# Patient Record
Sex: FEMALE | Race: BLACK OR AFRICAN AMERICAN | Employment: UNEMPLOYED | URBAN - METROPOLITAN AREA
[De-identification: names, ages, dates, MRNs, and addresses within clinical notes are randomized per-mention and may not be internally consistent; named-entity substitution may affect disease eponyms.]

---

## 2017-01-05 ENCOUNTER — OFFICE VISIT (OUTPATIENT)
Dept: INTERNAL MEDICINE CLINIC | Age: 52
End: 2017-01-05

## 2017-01-05 VITALS
WEIGHT: 172 LBS | OXYGEN SATURATION: 99 % | HEART RATE: 72 BPM | HEIGHT: 63 IN | SYSTOLIC BLOOD PRESSURE: 145 MMHG | TEMPERATURE: 97.1 F | RESPIRATION RATE: 16 BRPM | BODY MASS INDEX: 30.48 KG/M2 | DIASTOLIC BLOOD PRESSURE: 89 MMHG

## 2017-01-05 DIAGNOSIS — M54.5 RIGHT LOW BACK PAIN, UNSPECIFIED CHRONICITY, WITH SCIATICA PRESENCE UNSPECIFIED: ICD-10-CM

## 2017-01-05 DIAGNOSIS — R22.0 SWELLING OF GUMS: Primary | ICD-10-CM

## 2017-01-05 DIAGNOSIS — K08.89 PAIN IN A TOOTH OR TEETH: ICD-10-CM

## 2017-01-05 PROBLEM — M54.50 LOW BACK PAIN: Status: ACTIVE | Noted: 2017-01-05

## 2017-01-05 LAB
BILIRUB UR QL STRIP: NEGATIVE
GLUCOSE UR-MCNC: NEGATIVE MG/DL
KETONES P FAST UR STRIP-MCNC: NEGATIVE MG/DL
PH UR STRIP: 5.5 [PH] (ref 4.6–8)
PROT UR QL STRIP: NEGATIVE MG/DL
SP GR UR STRIP: 1.01 (ref 1–1.03)
UA UROBILINOGEN AMB POC: NORMAL (ref 0.2–1)
URINALYSIS CLARITY POC: CLEAR
URINALYSIS COLOR POC: YELLOW
URINE BLOOD POC: NEGATIVE
URINE LEUKOCYTES POC: NEGATIVE
URINE NITRITES POC: NEGATIVE

## 2017-01-05 RX ORDER — AMOXICILLIN AND CLAVULANATE POTASSIUM 875; 125 MG/1; MG/1
1 TABLET, FILM COATED ORAL EVERY 12 HOURS
Qty: 20 TAB | Refills: 0 | Status: SHIPPED | OUTPATIENT
Start: 2017-01-05 | End: 2017-01-15

## 2017-01-05 RX ORDER — IBUPROFEN 800 MG/1
800 TABLET ORAL
Qty: 90 TAB | Refills: 0 | Status: SHIPPED | OUTPATIENT
Start: 2017-01-05 | End: 2017-03-02 | Stop reason: SDUPTHER

## 2017-01-05 NOTE — MR AVS SNAPSHOT
Visit Information Date & Time Provider Department Dept. Phone Encounter #  
 1/5/2017 11:15 AM Waldemar Dunlap NP [facility] [phone] [id number] Your Appointments 1/13/2017  9:45 AM  
Office Visit with MD LENIN [facility]) Appt Note: 4 month f/u htn; Rescheduling Appointment From 12/26/2016  
 [address]  
  
   
 [address] [facility] Upcoming Health Maintenance Date Due Hepatitis C Screening 1965 COLONOSCOPY 4/30/1981 Pneumococcal 19-64 Medium Risk (1 of 1 - PPSV23) 4/30/1982 DTaP/Tdap/Td series (1 - Tdap) 4/30/1984 PAP AKA CERVICAL CYTOLOGY 4/30/1984 BREAST CANCER SCRN MAMMOGRAM 4/30/2013 INFLUENZA AGE 9 TO ADULT 8/1/2016 Allergies as of 1/5/2017  Review Complete On: 1/5/2017 By: Lorrain Snellen, LPN Severity Noted Reaction Type Reactions Hydrocodone-acetaminophen  08/25/2016    Other (comments) Current Immunizations  Reviewed on 8/25/2016 No immunizations on file. Not reviewed this visit You Were Diagnosed With   
  
 Codes Comments Swelling of gums    -  Primary ICD-10-CM: R22.0 ICD-9-CM: 784.2 Pain in a tooth or teeth     ICD-10-CM: K08.89 ICD-9-CM: 525.9 Vitals BP Pulse Temp Resp Height(growth percentile) Weight(growth percentile) 145/89 (BP 1 Location: Right arm, BP Patient Position: Sitting) 72 97.1 °F (36.2 °C) (Oral) 16 5' 3\" (1.6 m) 172 lb (78 kg) SpO2 BMI OB Status Smoking Status 99% 30.47 kg/m2 Postmenopausal Current Every Day Smoker Vitals History BMI and BSA Data Body Mass Index Body Surface Area  
 30.47 kg/m 2 1.86 m 2 Preferred Pharmacy Pharmacy Name Phone [facility] [address], [address] [place] [address] [phone] Your Updated Medication List  
  
   
 This list is accurate as of: 1/5/17 11:26 AM.  Always use your most recent med list. amLODIPine 10 mg tablet Commonly known as:  Ann Fraction Take 1 Tab by mouth daily. Indications: HYPERTENSION  
  
 amoxicillin-clavulanate 875-125 mg per tablet Commonly known as:  AUGMENTIN Take 1 Tab by mouth every twelve (12) hours for 10 days. clobetasol 0.05 % Sham  
Commonly known as:  CLOBEX Lather and leave on 15 minutes before rinsing. Use daily. divalproex  mg tablet Commonly known as:  DEPAKOTE Take 1 Tab by mouth three (3) times daily. furosemide 40 mg tablet Commonly known as:  LASIX Take 1/2 to 1 every day as needed for swelling  Indications: EDEMA  
  
 ibuprofen 800 mg tablet Commonly known as:  MOTRIN Take 1 Tab by mouth every eight (8) hours as needed for Pain for up to 30 days. lindane 1 % shampoo Commonly known as:  Gayleen Boyd Apply to scalp. Leave on 5 minutes before rinsing. Repeat in a week  
  
 tiZANidine 4 mg tablet Commonly known as:  Dorenda Sham Take one nightly as needed for muscle spasms Prescriptions Sent to Pharmacy Refills  
 amoxicillin-clavulanate (AUGMENTIN) 875-125 mg per tablet 0 Sig: Take 1 Tab by mouth every twelve (12) hours for 10 days. Class: Normal  
 Pharmacy: [facility] [address], [address] Ph #: [phone] Route: Oral  
 ibuprofen (MOTRIN) 800 mg tablet 0 Sig: Take 1 Tab by mouth every eight (8) hours as needed for Pain for up to 30 days. Class: Normal  
 Pharmacy: [facility] [address], [address] Ph #: [phone] Route: Oral  
  
Introducing [facility] & HEALTH SERVICES! Sam Hdez introduces [employer] patient portal. Now you can access parts of your medical record, email your doctor's office, and request medication refills online. 1. In your internet browser, go to [email or web address] 2. Click on the First Time User? Click Here link in the Sign In box. You will see the New Member Sign Up page. 3. Enter your [employer] Access Code exactly as it appears below. You will not need to use this code after youve completed the sign-up process. If you do not sign up before the expiration date, you must request a new code. · [employer] Access Code: [email or web address] Expires: 2/12/2017 11:37 AM 
 
4. Enter the last four digits of your Social Security Number (xxxx) and Date of Birth (mm/dd/yyyy) as indicated and click Submit. You will be taken to the next sign-up page. 5. Create a [employer] ID. This will be your [employer] login ID and cannot be changed, so think of one that is secure and easy to remember. 6. Create a [employer] password. You can change your password at any time. 7. Enter your Password Reset Question and Answer. This can be used at a later time if you forget your password. 8. Enter your e-mail address. You will receive e-mail notification when new information is available in [address] 9. Click Sign Up. You can now view and download portions of your medical record. 10. Click the Download Summary menu link to download a portable copy of your medical information. If you have questions, please visit the Frequently Asked Questions section of the [employer] website. Remember, [employer] is NOT to be used for urgent needs. For medical emergencies, dial 911. Now available from your iPhone and Android! Please provide this summary of care documentation to your next provider. Your primary care clinician is listed as [facility]. If you have any questions after today's visit, please call [phone].

## 2017-01-05 NOTE — PROGRESS NOTES
Pt presented today with gum swelling, pt thinks she may have an infection which is causing low back pain. Has pt had any falls since last visit? No.  Pt preferred language for health care discussion is english. Advanced Directive? No    Is someone accompanying this pt? No    Is the patient using any DME equipment during OV? No      1. Have you been to the ER, urgent care clinic since your last visit? Hospitalized since your last visit? No    2. Have you seen or consulted any other health care providers outside of the [address] [place] [address] since your last visit? Include any pap smears or colon screening. No      Pt has a reminder for a \"due or due soon\" health maintenance. I have asked that she contact his primary care provider for follow-up on this health maintenance.

## 2017-02-08 ENCOUNTER — TELEPHONE (OUTPATIENT)
Dept: INTERNAL MEDICINE CLINIC | Age: 52
End: 2017-02-08

## 2017-02-08 NOTE — TELEPHONE ENCOUNTER
2 pt. Identifiers confirmed. Pt. Stated that all week for 1 week she has has swelling, hives, itching and burning on her face only. Pt. Denies any throat swelling, wheezing, or SOB. She states she has not eaten or done anything different than usual. Pt. States she has not taken any benadryl because she thought she needed a prescription. Pt. Was told the she can get benadryl at any pharmacy, convenience store, or [employer] general so she could take this if she felt she was having an allergic reaction. Pt. Was told to come in and be seen in the first floor same day acute care clinic or any other urgent care nearby. She states transportation is an issue as she would need to take 2 buses. Pt. Was told to be seen ASAP was transferred to make an aptmt. For tomorrow as she prefers to see Dr. Doretha Kuhn, her PCP. No other questions/concerns at this time.

## 2017-03-02 DIAGNOSIS — M54.5 RIGHT LOW BACK PAIN, UNSPECIFIED CHRONICITY, WITH SCIATICA PRESENCE UNSPECIFIED: ICD-10-CM

## 2017-03-02 DIAGNOSIS — K08.89 PAIN IN A TOOTH OR TEETH: ICD-10-CM

## 2017-03-02 DIAGNOSIS — Z76.0 MEDICATION REFILL: ICD-10-CM

## 2017-03-02 RX ORDER — AMLODIPINE BESYLATE 10 MG/1
10 TABLET ORAL DAILY
Qty: 30 TAB | Refills: 5 | Status: SHIPPED | OUTPATIENT
Start: 2017-03-02 | End: 2017-09-14 | Stop reason: SDUPTHER

## 2017-03-02 RX ORDER — IBUPROFEN 800 MG/1
800 TABLET ORAL
Qty: 90 TAB | Refills: 5 | Status: SHIPPED | OUTPATIENT
Start: 2017-03-02 | End: 2018-05-23 | Stop reason: SDUPTHER

## 2017-03-02 NOTE — TELEPHONE ENCOUNTER
Patient request, last OV 1/8/17    Requested Prescriptions     Pending Prescriptions Disp Refills    amLODIPine (NORVASC) 10 mg tablet 30 Tab 5     Sig: Take 1 Tab by mouth daily. Indications: hypertension    ibuprofen (MOTRIN) 800 mg tablet 90 Tab 0     Sig: Take 1 Tab by mouth every eight (8) hours as needed for Pain for up to 30 days.

## 2017-03-08 NOTE — TELEPHONE ENCOUNTER
2 pt. Identifiers confirmed. F/u with pt from 2/8/17 issues with s/s or allergic reaction. Pt. States she is doing well. She states she never did get any benadryl for her previus s/s and never f/u in an urgent care. She stateds her s/s had resolved but have come back in the last few days. Pt. Denies any SOB (any more than usual as she is a smoker) or throat swelling. She states she is treating her hives/facial swelling with a facial she got at the store. Pt. Agreed to make a f/u aptmt to see Dr. Ata Cruz. No other questions/concerns at this time. Pt. Was transferred to  to schedule aptmt.

## 2017-03-09 ENCOUNTER — OFFICE VISIT (OUTPATIENT)
Dept: INTERNAL MEDICINE CLINIC | Age: 52
End: 2017-03-09

## 2017-03-09 VITALS
TEMPERATURE: 97.7 F | OXYGEN SATURATION: 98 % | SYSTOLIC BLOOD PRESSURE: 150 MMHG | RESPIRATION RATE: 16 BRPM | HEIGHT: 63 IN | DIASTOLIC BLOOD PRESSURE: 84 MMHG | HEART RATE: 75 BPM | BODY MASS INDEX: 30.23 KG/M2 | WEIGHT: 170.6 LBS

## 2017-03-09 DIAGNOSIS — L29.9 ITCHING: Primary | ICD-10-CM

## 2017-03-09 DIAGNOSIS — T78.40XA ALLERGIC REACTION, INITIAL ENCOUNTER: ICD-10-CM

## 2017-03-09 DIAGNOSIS — R22.0 SWELLING OF GUMS: ICD-10-CM

## 2017-03-09 RX ORDER — LEVOCETIRIZINE DIHYDROCHLORIDE 5 MG/1
5 TABLET, FILM COATED ORAL DAILY
Qty: 30 TAB | Refills: 2 | Status: SHIPPED | OUTPATIENT
Start: 2017-03-09 | End: 2018-06-15

## 2017-03-09 RX ORDER — MONTELUKAST SODIUM 10 MG/1
10 TABLET ORAL DAILY
Qty: 30 TAB | Refills: 2 | Status: SHIPPED | OUTPATIENT
Start: 2017-03-09 | End: 2018-06-15

## 2017-03-09 NOTE — PROGRESS NOTES
ROOM # 3    April Edgar presents today for   Chief Complaint   Patient presents with    Rash     pt had a rash on her face, states that she bought a facial cream to help it    Swelling     face and gums       April Edgar preferred language for health care discussion is english/other. Is someone accompanying this pt? no    Is the patient using any DME equipment during OV? no    Depression Screening:  PHQ 2 / 9, over the last two weeks 3/9/2017 3/9/2017 8/25/2016 8/25/2016 8/4/2016 3/3/2016 5/7/2015   Little interest or pleasure in doing things Not at all Not at all Not at all Not at all Not at all Not at all Not at all   Feeling down, depressed or hopeless Not at all Not at all Not at all Not at all Not at all Not at all Not at all   Total Score PHQ 2 0 0 0 0 0 0 0       Learning Assessment:  Learning Assessment 5/7/2015 5/13/2014   PRIMARY LEARNER Patient Patient   HIGHEST LEVEL OF EDUCATION - PRIMARY LEARNER  DID NOT GRADUATE HIGH SCHOOL DID NOT [facility]ADU[facility] CAREGIVER - No   PRIMARY [address]    NEED - No   LEARNER PREFERENCE PRIMARY DEMONSTRATION [address] - none   ANSWERED BY patient self   RELATIONSHIP SELF SELF       Abuse Screening:  Abuse Screening Questionnaire 4/6/2015   Do you ever feel afraid of your partner? N   Are you in a relationship with someone who physically or mentally threatens you? N   Is it safe for you to go home? Y       Fall Risk  No flowsheet data found. Health Maintenance reviewed and discussed per provider. Yes    April Edgar is due for MULTIPLE, see hm due. Please order/place referral if appropriate. Advance Directive:  1. Do you have an advance directive in place? Patient Reply: no    2. If not, would you like material regarding how to put one in place? Patient Reply: no    Coordination of Care:  1.  Have you been to the ER, urgent care clinic since your last visit? Hospitalized since your last visit? no    2. Have you seen or consulted any other health care providers outside of the [facility] [place] [facility]"" since your last visit? Include any pap smears or colon screening.  no

## 2017-03-09 NOTE — PROGRESS NOTES
HISTORY OF PRESENT ILLNESS  Cabrera Reed is a 46 y.o. female. Visit Vitals    /84 (BP 1 Location: Left arm, BP Patient Position: Sitting)    Pulse 75    Temp 97.7 °F (36.5 °C) (Oral)    Resp 16    Ht 5' 3\" (1.6 m)    Wt 170 lb 9.6 oz (77.4 kg)    SpO2 98%    BMI 30.22 kg/m2       HPI Comments: Pt has recurrent skin itching and irritation lis on her neck and face. Her face burns and swells. Her gums feel swollen  She lives in a new building. She is worried about the vents. She has tried cleaning everything in her place. She tried changing detergents as well. But nothing changed. Skin Problem   The history is provided by the patient (see comments). This is a chronic problem. The current episode started more than 1 week ago. The problem occurs daily. The problem has not changed since onset. Review of Systems   Constitutional: Negative for chills and fever. Respiratory: Negative. Cardiovascular: Negative. Skin: Positive for itching and rash. Physical Exam   Constitutional: She is oriented to person, place, and time. She appears well-developed and well-nourished. No distress. HENT:   Right Ear: Tympanic membrane, external ear and ear canal normal.   Left Ear: Tympanic membrane, external ear and ear canal normal.   Nose: Mucosal edema present. Right sinus exhibits no maxillary sinus tenderness and no frontal sinus tenderness. Left sinus exhibits no maxillary sinus tenderness and no frontal sinus tenderness. Mouth/Throat: Uvula is midline, oropharynx is clear and moist and mucous membranes are normal.   Lower gum line does seem a bit swollen. Cardiovascular: Normal rate and regular rhythm. Pulmonary/Chest: Effort normal and breath sounds normal.   Musculoskeletal: She exhibits no edema. Neurological: She is alert and oriented to person, place, and time. Skin: Skin is warm and dry. She is not diaphoretic. No obvious rashes noted.     Psychiatric: She has a normal mood and affect. Nursing note and vitals reviewed. ASSESSMENT and PLAN    ICD-10-CM ICD-9-CM    1. Itching L29.9 698.9    2. Swelling of gums R22.0 784.2    3. Allergic reaction, initial encounter T78.40XA 995.3        Lab done in November was OK    No rashes noted today. ? Medication side effect--but as not all over, less likely    Will try antihistamaine and singulair for now.  Consider referral to allergist    F/u 3-4 weeks

## 2017-03-09 NOTE — MR AVS SNAPSHOT
Visit Information Date & Time Provider Department Dept. Phone Encounter #  
 3/9/2017 12:15 PM Linda Osborne "[employer] [phone] [id number] Follow-up Instructions Return in about 3 weeks (around 3/30/2017) for itching and rash. ? medication. Upcoming Health Maintenance Date Due Hepatitis C Screening 1965 COLONOSCOPY 4/30/1981 Pneumococcal 19-64 Medium Risk (1 of 1 - PPSV23) 4/30/1982 DTaP/Tdap/Td series (1 - Tdap) 4/30/1984 PAP AKA CERVICAL CYTOLOGY 4/30/1984 BREAST CANCER SCRN MAMMOGRAM 4/30/2013 Allergies as of 3/9/2017  Review Complete On: 3/9/2017 By: Linda Osborne MD  
  
 Severity Noted Reaction Type Reactions Hydrocodone-acetaminophen  08/25/2016    Other (comments) Current Immunizations  Reviewed on 8/25/2016 No immunizations on file. Not reviewed this visit You Were Diagnosed With   
  
 Codes Comments Itching    -  Primary ICD-10-CM: L29.9 ICD-9-CM: 066. 9 Swelling of gums     ICD-10-CM: R22.0 ICD-9-CM: 601. 2 Allergic reaction, initial encounter     ICD-10-CM: T78.40XA ICD-9-CM: 222. 3 Vitals BP Pulse Temp Resp Height(growth percentile) Weight(growth percentile) 150/84 (BP 1 Location: Left arm, BP Patient Position: Sitting) 75 97.7 °F (36.5 °C) (Oral) 16 5' 3\" (1.6 m) 170 lb 9.6 oz (77.4 kg) SpO2 BMI OB Status Smoking Status 98% 30.22 kg/m2 Postmenopausal Current Every Day Smoker Vitals History BMI and BSA Data Body Mass Index Body Surface Area  
 30.22 kg/m 2 1.85 m 2 Preferred Pharmacy Pharmacy Name Phone [facility] [address], [address] [place] [address] [phone] Your Updated Medication List  
  
   
This list is accurate as of: 3/9/17  1:04 PM.  Always use your most recent med list. amLODIPine 10 mg tablet Commonly known as:  Galilea Spruce Take 1 Tab by mouth daily. Indications: hypertension  
  
 divalproex  mg tablet Commonly known as:  DEPAKOTE Take 1 Tab by mouth three (3) times daily. furosemide 40 mg tablet Commonly known as:  LASIX Take 1/2 to 1 every day as needed for swelling  Indications: EDEMA  
  
 ibuprofen 800 mg tablet Commonly known as:  MOTRIN Take 1 Tab by mouth every eight (8) hours as needed for Pain.  
  
 levocetirizine 5 mg tablet Commonly known as:  Lore Lynndyl Take 1 Tab by mouth daily. Indications: URTICARIA  
  
 montelukast 10 mg tablet Commonly known as:  SINGULAIR Take 1 Tab by mouth daily. Prescriptions Sent to Pharmacy Refills  
 montelukast (SINGULAIR) 10 mg tablet 2 Sig: Take 1 Tab by mouth daily. Class: Normal  
 Pharmacy: [address], [address] Ph #: [phone] Route: Oral  
 levocetirizine (XYZAL) 5 mg tablet 2 Sig: Take 1 Tab by mouth daily. Indications: URTICARIA Class: Normal  
 Pharmacy: [address], [address] Ph #: [phone] Route: Oral  
  
Follow-up Instructions Return in about 3 weeks (around 3/30/2017) for itching and rash. ? medication. Introducing [facility] SERVICES! Malou Mi introduces [employer] patient portal. Now you can access parts of your medical record, email your doctor's office, and request medication refills online. 1. In your internet browser, go to [email or web address] 2. Click on the First Time User? Click Here link in the Sign In box. You will see the New Member Sign Up page. 3. Enter your [employer] Access Code exactly as it appears below. You will not need to use this code after youve completed the sign-up process. If you do not sign up before the expiration date, you must request a new code. · [employer] Access Code: [email or web address] Expires: 6/7/2017  1:04 PM 
 
 4. Enter the last four digits of your Social Security Number (xxxx) and Date of Birth (mm/dd/yyyy) as indicated and click Submit. You will be taken to the next sign-up page. 5. Create a [employer] ID. This will be your [employer] login ID and cannot be changed, so think of one that is secure and easy to remember. 6. Create a [employer] password. You can change your password at any time. 7. Enter your Password Reset Question and Answer. This can be used at a later time if you forget your password. 8. Enter your e-mail address. You will receive e-mail notification when new information is available in [address] 9. Click Sign Up. You can now view and download portions of your medical record. 10. Click the Download Summary menu link to download a portable copy of your medical information. If you have questions, please visit the Frequently Asked Questions section of the [employer] website. Remember, [employer] is NOT to be used for urgent needs. For medical emergencies, dial 911. Now available from your iPhone and Android! Please provide this summary of care documentation to your next provider. Your primary care clinician is listed as [facility]. If you have any questions after today's visit, please call [phone].

## 2017-05-01 ENCOUNTER — OFFICE VISIT (OUTPATIENT)
Dept: INTERNAL MEDICINE CLINIC | Age: 52
End: 2017-05-01

## 2017-05-01 ENCOUNTER — HOSPITAL ENCOUNTER (OUTPATIENT)
Dept: LAB | Age: 52
Discharge: HOME | End: 2017-05-01
Payer: MEDICAID

## 2017-05-01 VITALS
RESPIRATION RATE: 16 BRPM | BODY MASS INDEX: 30.3 KG/M2 | WEIGHT: 171 LBS | HEIGHT: 63 IN | TEMPERATURE: 98.3 F | DIASTOLIC BLOOD PRESSURE: 89 MMHG | OXYGEN SATURATION: 95 % | SYSTOLIC BLOOD PRESSURE: 130 MMHG | HEART RATE: 80 BPM

## 2017-05-01 DIAGNOSIS — R39.9 UTI SYMPTOMS: Primary | ICD-10-CM

## 2017-05-01 DIAGNOSIS — R52 BODY ACHES: ICD-10-CM

## 2017-05-01 DIAGNOSIS — R82.90 ABNORMAL URINE ODOR: ICD-10-CM

## 2017-05-01 PROBLEM — Z00.00 ANNUAL PHYSICAL EXAM: Status: ACTIVE | Noted: 2017-05-01

## 2017-05-01 PROCEDURE — 36415 COLL VENOUS BLD VENIPUNCTURE: CPT | Performed by: NURSE PRACTITIONER

## 2017-05-01 PROCEDURE — 82652 VIT D 1 25-DIHYDROXY: CPT | Performed by: NURSE PRACTITIONER

## 2017-05-01 RX ORDER — SULFAMETHOXAZOLE AND TRIMETHOPRIM 800; 160 MG/1; MG/1
1 TABLET ORAL 2 TIMES DAILY
Qty: 6 TAB | Refills: 0 | Status: SHIPPED | OUTPATIENT
Start: 2017-05-01 | End: 2017-05-04

## 2017-05-01 NOTE — MR AVS SNAPSHOT
Visit Information Date & Time Provider Department Dept. Phone Encounter #  
 5/1/2017 11:30 AM Amy Aranda NP [facility] [address] [id number] Upcoming Health Maintenance Date Due Hepatitis C Screening 1965 COLONOSCOPY 4/30/1981 Pneumococcal 19-64 Medium Risk (1 of 1 - PPSV23) 4/30/1982 DTaP/Tdap/Td series (1 - Tdap) 4/30/1984 PAP AKA CERVICAL CYTOLOGY 4/30/1984 BREAST CANCER SCRN MAMMOGRAM 4/30/2013 INFLUENZA AGE 9 TO ADULT 8/1/2017 Allergies as of 5/1/2017  Review Complete On: 5/1/2017 By: Murphy Hernandez LPN Severity Noted Reaction Type Reactions Hydrocodone-acetaminophen  08/25/2016    Other (comments) Current Immunizations  Reviewed on 8/25/2016 No immunizations on file. Not reviewed this visit You Were Diagnosed With   
  
 Codes Comments UTI symptoms    -  Primary ICD-10-CM: R39.9 ICD-9-CM: 788.99 Abnormal urine odor     ICD-10-CM: R82.90 ICD-9-CM: 791.9 Body aches     ICD-10-CM: R52 ICD-9-CM: 780.96 Vitals BP Pulse Temp Resp Height(growth percentile) Weight(growth percentile) 130/89 (BP 1 Location: Right arm, BP Patient Position: Sitting) 80 98.3 °F (36.8 °C) (Oral) 16 5' 3\" (1.6 m) 171 lb (77.6 kg) SpO2 BMI OB Status Smoking Status 95% 30.29 kg/m2 Postmenopausal Current Every Day Smoker Vitals History BMI and BSA Data Body Mass Index Body Surface Area  
 30.29 kg/m 2 1.86 m 2 Preferred Pharmacy Pharmacy Name Phone [facility] [address], [address] [place] [address] [phone] Your Updated Medication List  
  
   
This list is accurate as of: 5/1/17 11:43 AM.  Always use your most recent med list. amLODIPine 10 mg tablet Commonly known as:  Pettit Staggers Take 1 Tab by mouth daily. Indications: hypertension  
  
 divalproex  mg tablet Commonly known as:  DEPAKOTE  
 Take 1 Tab by mouth three (3) times daily. furosemide 40 mg tablet Commonly known as:  LASIX Take 1/2 to 1 every day as needed for swelling  Indications: EDEMA  
  
 ibuprofen 800 mg tablet Commonly known as:  MOTRIN Take 1 Tab by mouth every eight (8) hours as needed for Pain.  
  
 levocetirizine 5 mg tablet Commonly known as:  Ananya Erichsen Take 1 Tab by mouth daily. Indications: URTICARIA  
  
 montelukast 10 mg tablet Commonly known as:  SINGULAIR Take 1 Tab by mouth daily. trimethoprim-sulfamethoxazole 160-800 mg per tablet Commonly known as:  BACTRIM DS, SEPTRA DS Take 1 Tab by mouth two (2) times a day for 3 days. Prescriptions Sent to Pharmacy Refills  
 trimethoprim-sulfamethoxazole (BACTRIM DS, SEPTRA DS) 160-800 mg per tablet 0 Sig: Take 1 Tab by mouth two (2) times a day for 3 days. Class: Normal  
 Pharmacy: [facility] [address], [address] Ph #: [phone] Route: Oral  
  
We Performed the Following AMB POC URINALYSIS DIP STICK AUTO W/O MICRO [65098 CPT(R)] To-Do List   
 05/01/2017 Lab:  VITAMIN D, 1, 25 DIHYDROXY Introducing [facility]! [employer] introduces [employer] patient portal. Now you can access parts of your medical record, email your doctor's office, and request medication refills online. 1. In your internet browser, go to [email or web address] 2. Click on the First Time User? Click Here link in the Sign In box. You will see the New Member Sign Up page. 3. Enter your [employer] Access Code exactly as it appears below. You will not need to use this code after youve completed the sign-up process. If you do not sign up before the expiration date, you must request a new code. · [employer] Access Code: [email or web address] Expires: 6/7/2017  2:04 PM 
 
4.  Enter the last four digits of your Social Security Number (xxxx) and Date of Birth (mm/dd/yyyy) as indicated and click Submit. You will be taken to the next sign-up page. 5. Create a [employer] ID. This will be your [employer] login ID and cannot be changed, so think of one that is secure and easy to remember. 6. Create a [employer] password. You can change your password at any time. 7. Enter your Password Reset Question and Answer. This can be used at a later time if you forget your password. 8. Enter your e-mail address. You will receive e-mail notification when new information is available in [address] 9. Click Sign Up. You can now view and download portions of your medical record. 10. Click the Download Summary menu link to download a portable copy of your medical information. If you have questions, please visit the Frequently Asked Questions section of the [employer] website. Remember, [employer] is NOT to be used for urgent needs. For medical emergencies, dial 911. Now available from your iPhone and Android! Please provide this summary of care documentation to your next provider. Your primary care clinician is listed as [facility]. If you have any questions after today's visit, please call [phone].

## 2017-05-01 NOTE — PROGRESS NOTES
Pt presented today with vaginal burning after urination x 2 weeks, pt reports she also has been having body aches. Has pt had any falls since last visit? No.  Pt preferred language for health care discussion is english. Advanced Directive? No    Is someone accompanying this pt? No    Is the patient using any DME equipment during OV? No      1. Have you been to the ER, urgent care clinic since your last visit? Hospitalized since your last visit? No    2. Have you seen or consulted any other health care providers outside of the [address] [place] [address] since your last visit? Include any pap smears or colon screening. No      Pt has a reminder for a \"due or due soon\" health maintenance. I have asked that she contact his primary care provider for follow-up on this health maintenance.

## 2017-05-01 NOTE — PROGRESS NOTES
HISTORY OF PRESENT ILLNESS  Ronaldo Kohler is a 45 y.o. female. Urinary Burning   The history is provided by the patient. This is a new problem. The current episode started more than 2 days ago. The problem occurs constantly. The problem has been gradually worsening. Pertinent negatives include no chest pain, no abdominal pain, no headaches and no shortness of breath. Associated symptoms comments: Frequency and urinary pain. Nothing aggravates the symptoms. Nothing relieves the symptoms. She has tried water for the symptoms. The treatment provided no relief. Generalized Body Aches   The history is provided by the patient. This is a new problem. Episode onset: few months ago. The problem occurs constantly. The problem has been gradually worsening. Pertinent negatives include no chest pain, no abdominal pain, no headaches and no shortness of breath. Nothing aggravates the symptoms. Nothing relieves the symptoms. She has tried nothing for the symptoms. The treatment provided no relief. Review of Systems   Constitutional: Positive for malaise/fatigue. Negative for chills, diaphoresis and fever. HENT: Negative for congestion and sore throat. Respiratory: Negative for cough and shortness of breath. Cardiovascular: Negative for chest pain and palpitations. Gastrointestinal: Negative for abdominal pain, heartburn, nausea and vomiting. Genitourinary: Positive for dysuria, frequency and urgency. Negative for flank pain and hematuria. Musculoskeletal: Positive for back pain, joint pain and myalgias. Negative for falls and neck pain. Skin: Negative for itching. Neurological: Negative for dizziness, tingling, weakness and headaches. Endo/Heme/Allergies: Negative for environmental allergies and polydipsia. Psychiatric/Behavioral: Negative for depression. The patient is not nervous/anxious. Physical Exam   Constitutional: She is oriented to person, place, and time.  She appears well-developed and well-nourished. No distress. HENT:   Head: Normocephalic and atraumatic. Right Ear: External ear normal.   Left Ear: External ear normal.   Mouth/Throat: Oropharynx is clear and moist. No oropharyngeal exudate. Neck: Neck supple. Cardiovascular: Regular rhythm. Pulmonary/Chest: Breath sounds normal. No respiratory distress. She has no wheezes. Abdominal: Soft. Bowel sounds are normal. She exhibits no distension. There is no tenderness. There is no rebound. Musculoskeletal: Normal range of motion. She exhibits no edema, tenderness or deformity. Lymphadenopathy:     She has no cervical adenopathy. Neurological: She is alert and oriented to person, place, and time. No cranial nerve deficit. Skin: Skin is dry. She is not diaphoretic. No erythema. Psychiatric: She has a normal mood and affect. Nursing note and vitals reviewed. ASSESSMENT and PLAN    ICD-10-CM ICD-9-CM    1. UTI symptoms R39.9 788.99 trimethoprim-sulfamethoxazole (BACTRIM DS, SEPTRA DS) 160-800 mg per tablet   2. Abnormal urine odor R82.90 791.9 AMB POC URINALYSIS DIP STICK AUTO W/O MICRO      trimethoprim-sulfamethoxazole (BACTRIM DS, SEPTRA DS) 160-800 mg per tablet   3. Body aches R52 780.96 VITAMIN D, 1, 25 DIHYDROXY   Patient advised to take medication as prescribed. Take rest and plenty of fluids. Return to clinic if condition worsens in next 72 hours.

## 2017-05-02 LAB — 1,25(OH)2D3 SERPL-MCNC: 44.4 PG/ML (ref 19.9–79.3)

## 2017-05-16 LAB
BILIRUB UR QL STRIP: NEGATIVE
GLUCOSE UR-MCNC: NEGATIVE MG/DL
KETONES P FAST UR STRIP-MCNC: NEGATIVE MG/DL
PH UR STRIP: 6.5 [PH] (ref 4.6–8)
PROT UR QL STRIP: NEGATIVE MG/DL
SP GR UR STRIP: 1.01 (ref 1–1.03)
UA UROBILINOGEN AMB POC: NORMAL (ref 0.2–1)
URINALYSIS CLARITY POC: CLEAR
URINALYSIS COLOR POC: YELLOW
URINE BLOOD POC: NORMAL
URINE LEUKOCYTES POC: NORMAL
URINE NITRITES POC: NEGATIVE

## 2017-06-05 ENCOUNTER — OFFICE VISIT (OUTPATIENT)
Dept: INTERNAL MEDICINE CLINIC | Age: 52
End: 2017-06-05

## 2017-06-05 ENCOUNTER — HOSPITAL ENCOUNTER (OUTPATIENT)
Dept: LAB | Age: 52
Discharge: HOME | End: 2017-06-05
Payer: MEDICAID

## 2017-06-05 VITALS
TEMPERATURE: 97.7 F | HEART RATE: 65 BPM | OXYGEN SATURATION: 98 % | SYSTOLIC BLOOD PRESSURE: 152 MMHG | HEIGHT: 63 IN | BODY MASS INDEX: 29.41 KG/M2 | RESPIRATION RATE: 16 BRPM | DIASTOLIC BLOOD PRESSURE: 93 MMHG | WEIGHT: 166 LBS

## 2017-06-05 DIAGNOSIS — N30.01 ACUTE CYSTITIS WITH HEMATURIA: Primary | ICD-10-CM

## 2017-06-05 DIAGNOSIS — R04.0 BLEEDING NOSE: ICD-10-CM

## 2017-06-05 DIAGNOSIS — R21 RASH: ICD-10-CM

## 2017-06-05 DIAGNOSIS — R39.9 UTI SYMPTOMS: ICD-10-CM

## 2017-06-05 DIAGNOSIS — L29.9 ITCHING: ICD-10-CM

## 2017-06-05 LAB
BASOPHILS # BLD AUTO: 0 K/UL (ref 0–0.06)
BASOPHILS # BLD: 0 % (ref 0–2)
BILIRUB UR QL STRIP: NEGATIVE
DIFFERENTIAL METHOD BLD: ABNORMAL
EOSINOPHIL # BLD: 0 K/UL (ref 0–0.4)
EOSINOPHIL NFR BLD: 1 % (ref 0–5)
ERYTHROCYTE [DISTWIDTH] IN BLOOD BY AUTOMATED COUNT: 14.2 % (ref 11.6–14.5)
GLUCOSE UR-MCNC: NEGATIVE MG/DL
HCT VFR BLD AUTO: 39 % (ref 35–45)
HGB BLD-MCNC: 13 G/DL (ref 12–16)
KETONES P FAST UR STRIP-MCNC: NORMAL MG/DL
LYMPHOCYTES # BLD AUTO: 42 % (ref 21–52)
LYMPHOCYTES # BLD: 2.1 K/UL (ref 0.9–3.6)
MCH RBC QN AUTO: 31 PG (ref 24–34)
MCHC RBC AUTO-ENTMCNC: 33.3 G/DL (ref 31–37)
MCV RBC AUTO: 93.1 FL (ref 74–97)
MONOCYTES # BLD: 0.3 K/UL (ref 0.05–1.2)
MONOCYTES NFR BLD AUTO: 6 % (ref 3–10)
NEUTS SEG # BLD: 2.6 K/UL (ref 1.8–8)
NEUTS SEG NFR BLD AUTO: 51 % (ref 40–73)
PH UR STRIP: 5 [PH] (ref 4.6–8)
PLATELET # BLD AUTO: 303 K/UL (ref 135–420)
PMV BLD AUTO: 9.8 FL (ref 9.2–11.8)
PROT UR QL STRIP: NEGATIVE MG/DL
RBC # BLD AUTO: 4.19 M/UL (ref 4.2–5.3)
SP GR UR STRIP: 1.01 (ref 1–1.03)
UA UROBILINOGEN AMB POC: NORMAL (ref 0.2–1)
URINALYSIS CLARITY POC: CLEAR
URINALYSIS COLOR POC: YELLOW
URINE BLOOD POC: NORMAL
URINE LEUKOCYTES POC: NORMAL
URINE NITRITES POC: NEGATIVE
WBC # BLD AUTO: 5.1 K/UL (ref 4.6–13.2)

## 2017-06-05 PROCEDURE — 85025 COMPLETE CBC W/AUTO DIFF WBC: CPT | Performed by: NURSE PRACTITIONER

## 2017-06-05 PROCEDURE — 36415 COLL VENOUS BLD VENIPUNCTURE: CPT | Performed by: NURSE PRACTITIONER

## 2017-06-05 RX ORDER — HYDROXYZINE HYDROCHLORIDE 10 MG/1
10 TABLET, FILM COATED ORAL
Qty: 21 TAB | Refills: 0 | Status: SHIPPED | OUTPATIENT
Start: 2017-06-05 | End: 2017-06-12

## 2017-06-05 RX ORDER — SULFAMETHOXAZOLE AND TRIMETHOPRIM 800; 160 MG/1; MG/1
1 TABLET ORAL 2 TIMES DAILY
Qty: 10 TAB | Refills: 0 | Status: SHIPPED | OUTPATIENT
Start: 2017-06-05 | End: 2017-06-10

## 2017-06-05 NOTE — PATIENT INSTRUCTIONS

## 2017-06-05 NOTE — PROGRESS NOTES
Pt presented today with vaginal burning x 2 weeks and pt reports she is experiencing a neck rash and body itching. Has pt had any falls since last visit? No.  Pt preferred language for health care discussion is english. Advanced Directive? No    Is someone accompanying this pt? No    Is the patient using any DME equipment during OV? No      1. Have you been to the ER, urgent care clinic since your last visit? Hospitalized since your last visit? No    2. Have you seen or consulted any other health care providers outside of the [facility] since your last visit? Include any pap smears or colon screening. No      Pt has a reminder for a \"due or due soon\" health maintenance. I have asked that she contact his primary care provider for follow-up on this health maintenance.

## 2017-06-05 NOTE — MR AVS SNAPSHOT
Visit Information Date & Time Provider Department Dept. Phone Encounter #  
 6/5/2017 11:00 AM Zachary Enriquez NP [facility] [phone] [id number] Your Appointments 6/12/2017  1:15 PM  
Office Visit with MD LENIN [facility]) Appt Note: STOMACH ISSUES; pt declined earlier appt; pt r/s from 05/26/2017  
 [address]  
  
   
 [address] [facility] Upcoming Health Maintenance Date Due Hepatitis C Screening 1965 COLONOSCOPY 4/30/1981 Pneumococcal 19-64 Medium Risk (1 of 1 - PPSV23) 4/30/1982 DTaP/Tdap/Td series (1 - Tdap) 4/30/1984 PAP AKA CERVICAL CYTOLOGY 4/30/1984 BREAST CANCER SCRN MAMMOGRAM 4/30/2013 INFLUENZA AGE 9 TO ADULT 8/1/2017 Allergies as of 6/5/2017  Review Complete On: 6/5/2017 By: Bala Sosa LPN Severity Noted Reaction Type Reactions Hydrocodone-acetaminophen  08/25/2016    Other (comments) Current Immunizations  Reviewed on 8/25/2016 No immunizations on file. Not reviewed this visit You Were Diagnosed With   
  
 Codes Comments Acute cystitis with hematuria    -  Primary ICD-10-CM: N30.01 
ICD-9-CM: 595.0 Rash     ICD-10-CM: R21 
ICD-9-CM: 782.1 Itching     ICD-10-CM: L29.9 ICD-9-CM: 698.9 Bleeding nose     ICD-10-CM: R04.0 ICD-9-CM: 784.7   
 UTI symptoms     ICD-10-CM: R39.9 ICD-9-CM: 788.99 Vitals BP Pulse Temp Resp Height(growth percentile) Weight(growth percentile) (!) 152/93 (BP 1 Location: Right arm, BP Patient Position: Sitting) 65 97.7 °F (36.5 °C) (Oral) 16 5' 3\" (1.6 m) 166 lb (75.3 kg) SpO2 BMI OB Status Smoking Status 98% 29.41 kg/m2 Postmenopausal Current Every Day Smoker Vitals History BMI and BSA Data  Body Mass Index Body Surface Area  
 29.41 kg/m 2 1.83 m 2  
  
  
 Preferred Pharmacy Pharmacy Name Phone [facility] [address], [address] [phone] Your Updated Medication List  
  
   
This list is accurate as of: 6/5/17 11:00 AM.  Always use your most recent med list. amLODIPine 10 mg tablet Commonly known as:  Claudell Hesselbach Take 1 Tab by mouth daily. Indications: hypertension  
  
 divalproex  mg tablet Commonly known as:  DEPAKOTE Take 1 Tab by mouth three (3) times daily. furosemide 40 mg tablet Commonly known as:  LASIX Take 1/2 to 1 every day as needed for swelling  Indications: EDEMA  
  
 hydrOXYzine HCl 10 mg tablet Commonly known as:  ATARAX Take 1 Tab by mouth three (3) times daily as needed for Itching for up to 7 days. ibuprofen 800 mg tablet Commonly known as:  MOTRIN Take 1 Tab by mouth every eight (8) hours as needed for Pain.  
  
 levocetirizine 5 mg tablet Commonly known as:  Avie Harsh Take 1 Tab by mouth daily. Indications: URTICARIA  
  
 montelukast 10 mg tablet Commonly known as:  SINGULAIR Take 1 Tab by mouth daily. trimethoprim-sulfamethoxazole 160-800 mg per tablet Commonly known as:  BACTRIM DS, SEPTRA DS Take 1 Tab by mouth two (2) times a day for 5 days. Prescriptions Sent to Pharmacy Refills  
 trimethoprim-sulfamethoxazole (BACTRIM DS, SEPTRA DS) 160-800 mg per tablet 0 Sig: Take 1 Tab by mouth two (2) times a day for 5 days. Class: Normal  
 Pharmacy: [facility] [address], [address] Ph #: [phone] Route: Oral  
 hydrOXYzine HCl (ATARAX) 10 mg tablet 0 Sig: Take 1 Tab by mouth three (3) times daily as needed for Itching for up to 7 days. Class: Normal  
 Pharmacy: [facility] [address], [address] Ph #: [phone] Route: Oral  
  
We Performed the Following AMB POC URINALYSIS DIP STICK MANUAL W/O MICRO [88578 CPT(R)] To-Do List   
 06/05/2017 Lab:  CBC WITH AUTOMATED DIFF Patient Instructions Rash: Care Instructions Your Care Instructions A rash is any irritation or inflammation of the skin. Rashes have many possible causes, including allergy, infection, illness, heat, and emotional stress. Follow-up care is a key part of your treatment and safety. Be sure to make and go to all appointments, and call your doctor if you are having problems. Its also a good idea to know your test results and keep a list of the medicines you take. How can you care for yourself at home? · Wash the area with water only. Soap can make dryness and itching worse. Pat dry. · Put cold, wet cloths on the rash to reduce itching. · Keep cool, and stay out of the sun. · Leave the rash open to the air as much of the time as possible. · Sometimes petroleum jelly (Vaseline) can help relieve the discomfort caused by a rash. A moisturizing lotion, such as Cetaphil, also may help. Calamine lotion may help for rashes caused by contact with something (such as a plant or soap) that irritated the skin. Use it 3 or 4 times a day. · If your doctor prescribed a cream, use it as directed. If your doctor prescribed medicine, take it exactly as directed. · If your rash itches so badly that it interferes with your normal activities, take an over-the-counter antihistamine, such as diphenhydramine (Benadryl) or loratadine (Claritin). Read and follow all instructions on the label. When should you call for help? Call your doctor now or seek immediate medical care if: 
· You have signs of infection, such as: 
¨ Increased pain, swelling, warmth, or redness. ¨ Red streaks leading from the area. ¨ Pus draining from the area. ¨ A fever. · You have joint pain along with the rash. Watch closely for changes in your health, and be sure to contact your doctor if: 
· Your rash is changing or getting worse.  For example, call if you have pain along with the rash, the rash is spreading, or you have new blisters. · You do not get better after 1 week. Where can you learn more? Go to http://ru-gigi.info/. Enter I857 in the search box to learn more about \"Rash: Care Instructions. \" Current as of: October 13, 2016 Content Version: 11.2 © 1355-3880 [employer]. Care instructions adapted under license by [employer] (which disclaims liability or warranty for this information). If you have questions about a medical condition or this instruction, always ask your healthcare professional. [place] [address] any warranty or liability for your use of this information. Introducing [facility]! Donovan Levine introduces Allak[employer] patient portal. Now you can access parts of your medical record, email your doctor's office, and request medication refills online. 1. In your internet browser, go to [email or web address] 2. Click on the First Time User? Click Here link in the Sign In box. You will see the New Member Sign Up page. 3. Enter your Allakos Access Code exactly as it appears below. You will not need to use this code after youve completed the sign-up process. If you do not sign up before the expiration date, you must request a new code. · All[employer] Access Code: [email or web address] Expires: 6/7/2017  2:04 PM 
 
4. Enter the last four digits of your Social Security Number (xxxx) and Date of Birth (mm/dd/yyyy) as indicated and click Submit. You will be taken to the next sign-up page. 5. Create a [employer] ID. This will be your Allakos login ID and cannot be changed, so think of one that is secure and easy to remember. 6. Create a [employer] password. You can change your password at any time. 7. Enter your Password Reset Question and Answer. This can be used at a later time if you forget your password. 8. Enter your e-mail address. You will receive e-mail notification when new information is available in [address] 9. Click Sign Up. You can now view and download portions of your medical record. 10. Click the Download Summary menu link to download a portable copy of your medical information. If you have questions, please visit the Frequently Asked Questions section of the [employer] website. Remember, [employer] is NOT to be used for urgent needs. For medical emergencies, dial 911. Now available from your iPhone and Android! Please provide this summary of care documentation to your next provider. Your primary care clinician is listed as [facility]. If you have any questions after today's visit, please call [phone].

## 2017-06-05 NOTE — PROGRESS NOTES
HISTORY OF PRESENT ILLNESS  Stephane Koyanagi is a 45 y.o. female. Urinary Burning   The history is provided by the patient. This is a new problem. The current episode started yesterday. The problem occurs constantly. The problem has not changed since onset. Pertinent negatives include no chest pain, no abdominal pain, no headaches and no shortness of breath. Nothing aggravates the symptoms. Nothing relieves the symptoms. She has tried nothing for the symptoms. The treatment provided no relief. Rash    The history is provided by the patient. This is a new problem. The current episode started more than 1 week ago. The problem has not changed since onset. The problem is associated with an unknown factor. There has been no fever. The rash is present on the neck and head. The pain is at a severity of 0/10. The patient is experiencing no pain. Associated symptoms include itching. Pertinent negatives include no blisters, no pain, no weeping and no hives. She has tried nothing for the symptoms. The treatment provided no relief. Review of Systems   Constitutional: Negative for chills, fever and malaise/fatigue. HENT: Negative for congestion and sore throat. Eyes: Negative for blurred vision and double vision. Respiratory: Negative for sputum production and shortness of breath. Cardiovascular: Negative for chest pain and palpitations. Gastrointestinal: Negative for abdominal pain, heartburn, nausea and vomiting. Genitourinary: Positive for dysuria, frequency and urgency. Negative for flank pain and hematuria. Musculoskeletal: Negative for myalgias and neck pain. Skin: Positive for itching and rash. Neurological: Negative for dizziness, tingling and headaches. Endo/Heme/Allergies: Negative for environmental allergies and polydipsia. Psychiatric/Behavioral: The patient is not nervous/anxious. Physical Exam   Constitutional: She is oriented to person, place, and time.  She appears well-developed and well-nourished. No distress. HENT:   Head: Normocephalic and atraumatic. Right Ear: External ear normal.   Left Ear: External ear normal.   Mouth/Throat: Oropharynx is clear and moist. No oropharyngeal exudate. Eyes: EOM are normal. Pupils are equal, round, and reactive to light. Neck: Normal range of motion. Neck supple. No thyromegaly present. Cardiovascular: Regular rhythm. Pulmonary/Chest: Effort normal and breath sounds normal. No respiratory distress. She has no wheezes. Lymphadenopathy:     She has no cervical adenopathy. Neurological: She is alert and oriented to person, place, and time. No cranial nerve deficit. Skin: Skin is warm and dry. She is not diaphoretic. Psychiatric: She has a normal mood and affect. Nursing note and vitals reviewed. ASSESSMENT and PLAN    ICD-10-CM ICD-9-CM    1. Acute cystitis with hematuria N30.01 595.0 trimethoprim-sulfamethoxazole (BACTRIM DS, SEPTRA DS) 160-800 mg per tablet   2. Rash R21 782.1    3. Itching L29.9 698.9 hydrOXYzine HCl (ATARAX) 10 mg tablet   4. Bleeding nose R04.0 784.7 CBC WITH AUTOMATED DIFF   5. UTI symptoms R39.9 788.99 AMB POC URINALYSIS DIP STICK MANUAL W/O MICRO   further plan of care will be established according to lab results and patient's response to current therapy. AVS instruction printed and provided to the patient. General instruction given regarding plan of care. Patient verbalized the understanding of all information.

## 2017-07-13 ENCOUNTER — OFFICE VISIT (OUTPATIENT)
Dept: INTERNAL MEDICINE CLINIC | Age: 52
End: 2017-07-13

## 2017-07-13 ENCOUNTER — HOSPITAL ENCOUNTER (OUTPATIENT)
Dept: LAB | Age: 52
Discharge: HOME | End: 2017-07-13
Payer: MEDICAID

## 2017-07-13 VITALS
WEIGHT: 166 LBS | DIASTOLIC BLOOD PRESSURE: 87 MMHG | TEMPERATURE: 98.2 F | HEIGHT: 63 IN | OXYGEN SATURATION: 97 % | SYSTOLIC BLOOD PRESSURE: 146 MMHG | HEART RATE: 81 BPM | BODY MASS INDEX: 29.41 KG/M2 | RESPIRATION RATE: 18 BRPM

## 2017-07-13 DIAGNOSIS — Z12.11 SCREEN FOR COLON CANCER: ICD-10-CM

## 2017-07-13 DIAGNOSIS — R10.30 LOWER ABDOMINAL PAIN: ICD-10-CM

## 2017-07-13 DIAGNOSIS — K59.09 CHRONIC CONSTIPATION: ICD-10-CM

## 2017-07-13 DIAGNOSIS — R10.2 PELVIC PAIN IN FEMALE: ICD-10-CM

## 2017-07-13 DIAGNOSIS — M54.41 ACUTE BILATERAL LOW BACK PAIN WITH RIGHT-SIDED SCIATICA: Primary | ICD-10-CM

## 2017-07-13 LAB
ALBUMIN SERPL BCP-MCNC: 4.2 G/DL (ref 3.4–5)
ALBUMIN/GLOB SERPL: 1.2 {RATIO} (ref 0.8–1.7)
ALP SERPL-CCNC: 119 U/L (ref 45–117)
ALT SERPL-CCNC: 29 U/L (ref 13–56)
ANION GAP BLD CALC-SCNC: 12 MMOL/L (ref 3–18)
APPEARANCE UR: CLEAR
AST SERPL W P-5'-P-CCNC: 25 U/L (ref 15–37)
BASOPHILS # BLD AUTO: 0 K/UL (ref 0–0.06)
BASOPHILS # BLD: 0 % (ref 0–2)
BILIRUB SERPL-MCNC: 0.4 MG/DL (ref 0.2–1)
BILIRUB UR QL: NEGATIVE
BUN SERPL-MCNC: 20 MG/DL (ref 7–18)
BUN/CREAT SERPL: 22 (ref 12–20)
CALCIUM SERPL-MCNC: 9.9 MG/DL (ref 8.5–10.1)
CHLORIDE SERPL-SCNC: 104 MMOL/L (ref 100–108)
CO2 SERPL-SCNC: 23 MMOL/L (ref 21–32)
COLOR UR: YELLOW
CREAT SERPL-MCNC: 0.9 MG/DL (ref 0.6–1.3)
DIFFERENTIAL METHOD BLD: ABNORMAL
EOSINOPHIL # BLD: 0 K/UL (ref 0–0.4)
EOSINOPHIL NFR BLD: 0 % (ref 0–5)
ERYTHROCYTE [DISTWIDTH] IN BLOOD BY AUTOMATED COUNT: 14.8 % (ref 11.6–14.5)
GLOBULIN SER CALC-MCNC: 3.5 G/DL (ref 2–4)
GLUCOSE SERPL-MCNC: 112 MG/DL (ref 74–99)
GLUCOSE UR STRIP.AUTO-MCNC: NEGATIVE MG/DL
HCT VFR BLD AUTO: 40.3 % (ref 35–45)
HGB BLD-MCNC: 13.3 G/DL (ref 12–16)
HGB UR QL STRIP: NEGATIVE
KETONES UR QL STRIP.AUTO: NEGATIVE MG/DL
LEUKOCYTE ESTERASE UR QL STRIP.AUTO: NEGATIVE
LYMPHOCYTES # BLD AUTO: 12 % (ref 21–52)
LYMPHOCYTES # BLD: 1.1 K/UL (ref 0.9–3.6)
MCH RBC QN AUTO: 30.5 PG (ref 24–34)
MCHC RBC AUTO-ENTMCNC: 33 G/DL (ref 31–37)
MCV RBC AUTO: 92.4 FL (ref 74–97)
MONOCYTES # BLD: 0.1 K/UL (ref 0.05–1.2)
MONOCYTES NFR BLD AUTO: 1 % (ref 3–10)
NEUTS SEG # BLD: 7.4 K/UL (ref 1.8–8)
NEUTS SEG NFR BLD AUTO: 87 % (ref 40–73)
NITRITE UR QL STRIP.AUTO: NEGATIVE
PH UR STRIP: 5.5 [PH] (ref 5–8)
PLATELET # BLD AUTO: 334 K/UL (ref 135–420)
PMV BLD AUTO: 10 FL (ref 9.2–11.8)
POTASSIUM SERPL-SCNC: 4.5 MMOL/L (ref 3.5–5.5)
PROT SERPL-MCNC: 7.7 G/DL (ref 6.4–8.2)
PROT UR STRIP-MCNC: NEGATIVE MG/DL
RBC # BLD AUTO: 4.36 M/UL (ref 4.2–5.3)
SODIUM SERPL-SCNC: 139 MMOL/L (ref 136–145)
SP GR UR REFRACTOMETRY: 1 (ref 1–1.03)
UROBILINOGEN UR QL STRIP.AUTO: 0.2 EU/DL (ref 0.2–1)
WBC # BLD AUTO: 8.6 K/UL (ref 4.6–13.2)

## 2017-07-13 PROCEDURE — 80053 COMPREHEN METABOLIC PANEL: CPT | Performed by: INTERNAL MEDICINE

## 2017-07-13 PROCEDURE — 81003 URINALYSIS AUTO W/O SCOPE: CPT | Performed by: INTERNAL MEDICINE

## 2017-07-13 PROCEDURE — 85025 COMPLETE CBC W/AUTO DIFF WBC: CPT | Performed by: INTERNAL MEDICINE

## 2017-07-13 RX ORDER — PREDNISONE 10 MG/1
10 TABLET ORAL
COMMUNITY
Start: 2017-06-29 | End: 2018-07-06 | Stop reason: ALTCHOICE

## 2017-07-13 RX ORDER — POLYETHYLENE GLYCOL 3350 17 G/17G
17 POWDER, FOR SOLUTION ORAL DAILY
Qty: 510 G | Refills: 5 | Status: SHIPPED | OUTPATIENT
Start: 2017-07-13 | End: 2018-07-06 | Stop reason: SDUPTHER

## 2017-07-13 NOTE — PROGRESS NOTES
Chief Complaint   Patient presents with    Abdominal Pain     and swelling in lower abdomin       Pt preferred language for health care discussion is English. Is someone accompanying this pt? no    Is the patient using any DME equipment during OV? no    Depression Screening:  PHQ over the last two weeks 7/13/2017 3/9/2017 3/9/2017 8/25/2016 8/25/2016 8/4/2016 3/3/2016   Little interest or pleasure in doing things Not at all Not at all Not at all Not at all Not at all Not at all Not at all   Feeling down, depressed or hopeless Not at all Not at all Not at all Not at all Not at all Not at all Not at all   Total Score PHQ 2 0 0 0 0 0 0 0       Learning Assessment:  Learning Assessment 5/7/2015 5/13/2014   PRIMARY LEARNER Patient Patient   HIGHEST LEVEL OF EDUCATION - PRIMARY LEARNER  DID NOT GRADUATE HIGH SCHOOL DID NOT GRADUATE [facility] PRIMARY LEARNER NONE NONE   CO-LEARNER CAREGIVER - No   PRIMARY [address]    NEED - No   LEARNER PREFERENCE PRIMARY DEMONSTRATION DEMONSTRATION   LEARNING SPECIAL TOPICS - none   ANSWERED BY patient self   RELATIONSHIP SELF SELF       Abuse Screening:  Abuse Screening Questionnaire 4/6/2015   Do you ever feel afraid of your partner? N   Are you in a relationship with someone who physically or mentally threatens you? N   Is it safe for you to go home? Y         Health Maintenance reviewed and discussed per provider. Yes    Pt is due for Colonoscopy, Mammogram, Pap, Hep C screen, Pneumo-13 or Peumo-23, Tdap. Please order/place referral if appropriate. Advance Directive:  1. Do you have an advance directive in place? Patient Reply:no    2. If not, would you like material regarding how to put one in place? Patient Reply: no    Coordination of Care:  1. Have you been to the ER, urgent care clinic since your last visit? Hospitalized since your last visit? [facility] 6/29/17    2.  Have you seen or consulted any other health care providers outside of the [address] [place] [address] [facility] since your last visit? Include any pap smears or colon screening.  no

## 2017-07-13 NOTE — PROGRESS NOTES
HISTORY OF PRESENT ILLNESS  Cris Ramirez is a 45 y.o. female. Visit Vitals    /87    Pulse 81    Temp 98.2 °F (36.8 °C) (Oral)    Resp 18    Ht 5' 3\" (1.6 m)    Wt 166 lb (75.3 kg)    SpO2 97%    BMI 29.41 kg/m2       HPI Comments: Pt has been having right sided pain. The worst pain in in her \"tailbone\" where she could not get comfortable. Her hip and upper leg hurt also. They took Xrays and meds. -- prednisone taper. She did not take it correctly. Abdominal Pain   The history is provided by the patient (see comments). This is a chronic problem. The current episode started more than 1 week ago. The problem occurs daily. Associated symptoms include abdominal pain (states she gets pubiic fullness and swelling. Bowels are not regular--tends toward constipation. ). Exacerbated by: activity. The symptoms are relieved by medications. Back Pain    The history is provided by the patient. This is a chronic problem. The current episode started more than 1 week ago. The problem has been gradually worsening. The problem occurs daily. The pain is present in the lumbar spine. The pain radiates to the right thigh and right knee. The pain is moderate. The symptoms are aggravated by certain positions. Associated symptoms include abdominal pain (states she gets pubiic fullness and swelling. Bowels are not regular--tends toward constipation. ). Pertinent negatives include no fever. Review of Systems   Constitutional: Negative. Negative for chills and fever. Gastrointestinal: Positive for abdominal pain (states she gets pubiic fullness and swelling. Bowels are not regular--tends toward constipation. ). Genitourinary:        Denies any bladder sxs     Musculoskeletal: Positive for back pain. Physical Exam   Constitutional: She is oriented to person, place, and time. She appears well-developed and well-nourished. No distress. Cardiovascular: Normal rate.     Pulmonary/Chest: Effort normal. Abdominal: Soft. Bowel sounds are normal. She exhibits no distension. There is no tenderness. There is no rebound and no guarding. Musculoskeletal: She exhibits no edema. strength appears normal on individual testing. Neg SLR bilat. Neurological: She is alert and oriented to person, place, and time. She displays abnormal reflex (slightly hyperreflexic bilat LE). Coordination normal.   Skin: Skin is warm and dry. She is not diaphoretic. Psychiatric: She has a normal mood and affect. Nursing note and vitals reviewed. ASSESSMENT and PLAN    ICD-10-CM ICD-9-CM    1. Acute bilateral low back pain with right-sided sciatica M54.41 724.2      724.3      338.19    2. Lower abdominal pain R10.30 789.09 REFERRAL TO GASTROENTEROLOGY      US PELV NON OBS      METABOLIC PANEL, COMPREHENSIVE      URINALYSIS W/ RFLX MICROSCOPIC      CBC WITH AUTOMATED DIFF   3. Pelvic pain in female R10.2 625.9 US PELV NON OBS      URINALYSIS W/ RFLX MICROSCOPIC   4. Chronic constipation K59.09 564.00 polyethylene glycol (MIRALAX) 17 gram/dose powder   5. Screen for colon cancer Z12.11 V76.51 REFERRAL TO GASTROENTEROLOGY     Available hospital record reviewed--from ER    Will try adding miralax for the bloat and constipation. Will request pelvic u/s--she never got the u/s ordered a year ago and the CT requested by the NP was never done due to insurance denial.    She is due for a colonoscopy    Update as no recent kidney or liver tests or urine    I suspect she has sciatica as the ER felt. Continue same meds for this. But no evidence of impending neurological emergency    Incidentally,  Discussed BMI/weight, lifestyle, diet and exercise. She is already working on this and has lost weight    BP up slightly--but she is hurting some and just took an ibuprofen.     F/u one month for recheck

## 2017-07-13 NOTE — MR AVS SNAPSHOT
Visit Information Date & Time Provider Department Dept. Phone Encounter #  
 7/13/2017 11:30 AM Allan Calvillo, [address] [id number] Follow-up Instructions Return in about 1 month (around 8/13/2017) for back and flank pain, u/s f/u. Upcoming Health Maintenance Date Due Hepatitis C Screening 1965 COLONOSCOPY 4/30/1981 Pneumococcal 19-64 Medium Risk (1 of 1 - PPSV23) 4/30/1982 DTaP/Tdap/Td series (1 - Tdap) 4/30/1984 PAP AKA CERVICAL CYTOLOGY 4/30/1984 BREAST CANCER SCRN MAMMOGRAM 4/30/2013 INFLUENZA AGE 9 TO ADULT 8/1/2017 Allergies as of 7/13/2017  Review Complete On: 7/13/2017 By: Allan Calvillo MD  
  
 Severity Noted Reaction Type Reactions Hydrocodone-acetaminophen  08/25/2016    Other (comments) Current Immunizations  Reviewed on 8/25/2016 No immunizations on file. Not reviewed this visit You Were Diagnosed With   
  
 Codes Comments Acute bilateral low back pain with right-sided sciatica    -  Primary ICD-10-CM: M54.41 
ICD-9-CM: 724.2, 724.3, 338.19 Lower abdominal pain     ICD-10-CM: R10.30 ICD-9-CM: 789.09 Pelvic pain in female     ICD-10-CM: R10.2 ICD-9-CM: 625.9 Chronic constipation     ICD-10-CM: K59.09 
ICD-9-CM: 564.00 Screen for colon cancer     ICD-10-CM: Z12.11 ICD-9-CM: V76.51 Vitals BP Pulse Temp Resp Height(growth percentile) Weight(growth percentile) 146/87 81 98.2 °F (36.8 °C) (Oral) 18 5' 3\" (1.6 m) 166 lb (75.3 kg) SpO2 BMI OB Status Smoking Status 97% 29.41 kg/m2 Postmenopausal Current Every Day Smoker Vitals History BMI and BSA Data Body Mass Index Body Surface Area  
 29.41 kg/m 2 1.83 m 2 Preferred Pharmacy Pharmacy Name Phone [facility] [address], [address] [place] [address] [phone] Your Updated Medication List  
  
   
 This list is accurate as of: 7/13/17 12:18 PM.  Always use your most recent med list. amLODIPine 10 mg tablet Commonly known as:  Sundra Williamson Take 1 Tab by mouth daily. Indications: hypertension  
  
 divalproex  mg tablet Commonly known as:  DEPAKOTE Take 1 Tab by mouth three (3) times daily. furosemide 40 mg tablet Commonly known as:  LASIX Take 1/2 to 1 every day as needed for swelling  Indications: EDEMA  
  
 ibuprofen 800 mg tablet Commonly known as:  MOTRIN Take 1 Tab by mouth every eight (8) hours as needed for Pain.  
  
 levocetirizine 5 mg tablet Commonly known as:  Fredrica Corby Take 1 Tab by mouth daily. Indications: URTICARIA  
  
 montelukast 10 mg tablet Commonly known as:  SINGULAIR Take 1 Tab by mouth daily. polyethylene glycol 17 gram/dose powder Commonly known as:  Lugene Minder Take 17 g by mouth daily. predniSONE 10 mg tablet Commonly known as:  DELTASONE  
10 mg.  
  
  
  
  
Prescriptions Sent to Pharmacy Refills  
 polyethylene glycol (MIRALAX) 17 gram/dose powder 5 Sig: Take 17 g by mouth daily. Class: Normal  
 Pharmacy: [facility] [address], [address] Ph #: [phone] Route: Oral  
  
We Performed the Following REFERRAL TO GASTROENTEROLOGY [BWX57 Custom] Follow-up Instructions Return in about 1 month (around 8/13/2017) for back and flank pain, u/s f/u. To-Do List   
 07/13/2017 Lab:  CBC WITH AUTOMATED DIFF   
  
 07/13/2017 Lab:  METABOLIC PANEL, COMPREHENSIVE   
  
 07/13/2017 Lab:  URINALYSIS W/ RFLX MICROSCOPIC   
  
 07/13/2017 Imaging:  US PELV NON OBS Referral Information Referral ID Referred By Referred To  
  
 [id number] [facility] Not Available Visits Status Start Date End Date 1 New Request 7/13/17 7/13/18  If your referral has a status of pending review or denied, additional information will be sent to support the outcome of this decision. Introducing [facility] & HEALTH SERVICES! Willadean Saint introduces [employer] patient portal. Now you can access parts of your medical record, email your doctor's office, and request medication refills online. 1. In your internet browser, go to [email or web address] 2. Click on the First Time User? Click Here link in the Sign In box. You will see the New Member Sign Up page. 3. Enter your Moka Access Code exactly as it appears below. You will not need to use this code after youve completed the sign-up process. If you do not sign up before the expiration date, you must request a new code. · Moka Access Code: [email or web address] Expires: 10/11/2017 12:18 PM 
 
4. Enter the last four digits of your Social Security Number (xxxx) and Date of Birth (mm/dd/yyyy) as indicated and click Submit. You will be taken to the next sign-up page. 5. Create a Moka ID. This will be your Moka login ID and cannot be changed, so think of one that is secure and easy to remember. 6. Create a Moka password. You can change your password at any time. 7. Enter your Password Reset Question and Answer. This can be used at a later time if you forget your password. 8. Enter your e-mail address. You will receive e-mail notification when new information is available in [address] 9. Click Sign Up. You can now view and download portions of your medical record. 10. Click the Download Summary menu link to download a portable copy of your medical information. If you have questions, please visit the Frequently Asked Questions section of the Moka website. Remember, [employer] is NOT to be used for urgent needs. For medical emergencies, dial 911. Now available from your iPhone and Android! Please provide this summary of care documentation to your next provider. Your primary care clinician is listed as [facility].  If you have any questions after today's visit, please call [phone].

## 2017-08-23 ENCOUNTER — HOSPITAL ENCOUNTER (OUTPATIENT)
Dept: ULTRASOUND IMAGING | Age: 52
Discharge: HOME | End: 2017-08-23
Attending: INTERNAL MEDICINE
Payer: MEDICAID

## 2017-08-23 DIAGNOSIS — R10.2 PELVIC PAIN IN FEMALE: ICD-10-CM

## 2017-08-23 DIAGNOSIS — R10.30 LOWER ABDOMINAL PAIN: ICD-10-CM

## 2017-08-23 PROCEDURE — 76830 TRANSVAGINAL US NON-OB: CPT

## 2017-08-24 ENCOUNTER — TELEPHONE (OUTPATIENT)
Dept: INTERNAL MEDICINE CLINIC | Age: 52
End: 2017-08-24

## 2017-08-24 NOTE — TELEPHONE ENCOUNTER
----- Message from Adam Morocho MD sent at 8/23/2017  7:18 PM EDT -----  Advise her that her ultrasound did not find anything significant.

## 2017-08-24 NOTE — TELEPHONE ENCOUNTER
Spoke with patient, confirmed name and . Advised patient of below message, per Dr. Dewane Mortimer. Patient verbalized understanding.      Be advised

## 2017-09-14 DIAGNOSIS — Z76.0 MEDICATION REFILL: ICD-10-CM

## 2017-09-14 DIAGNOSIS — R60.9 EDEMA, UNSPECIFIED TYPE: ICD-10-CM

## 2017-09-14 RX ORDER — AMLODIPINE BESYLATE 10 MG/1
10 TABLET ORAL DAILY
Qty: 30 TAB | Refills: 5 | Status: SHIPPED | OUTPATIENT
Start: 2017-09-14 | End: 2017-10-12 | Stop reason: SDUPTHER

## 2017-09-14 RX ORDER — FUROSEMIDE 40 MG/1
TABLET ORAL
Qty: 30 TAB | Refills: 5 | Status: SHIPPED | OUTPATIENT
Start: 2017-09-14 | End: 2018-11-28 | Stop reason: SDUPTHER

## 2017-10-12 DIAGNOSIS — Z76.0 MEDICATION REFILL: ICD-10-CM

## 2017-10-12 RX ORDER — PREDNISONE 10 MG/1
10 TABLET ORAL DAILY
Qty: 30 TAB | Refills: 1 | Status: CANCELLED | OUTPATIENT
Start: 2017-10-12

## 2017-10-12 RX ORDER — AMLODIPINE BESYLATE 10 MG/1
10 TABLET ORAL DAILY
Qty: 30 TAB | Refills: 5 | Status: SHIPPED | OUTPATIENT
Start: 2017-10-12 | End: 2018-09-30 | Stop reason: SDUPTHER

## 2017-10-12 NOTE — TELEPHONE ENCOUNTER
Requested Prescriptions     Pending Prescriptions Disp Refills    predniSONE (DELTASONE) 10 mg tablet       Si Tab.  amLODIPine (NORVASC) 10 mg tablet 30 Tab 5     Sig: Take 1 Tab by mouth daily.  Indications: hypertension

## 2017-10-13 NOTE — TELEPHONE ENCOUNTER
Contacted pt at [facility] number. Two patient Identifiers confirmed. Advised pt per Dr Adebayo Chen notes. Pt verbalized understanding and scheduled for November 28, 2017 with PCP.

## 2017-10-13 NOTE — TELEPHONE ENCOUNTER
Prednisone is not intended for long term treatment of mild arthritis. She needs to make an appointment to discuss this.

## 2017-10-13 NOTE — TELEPHONE ENCOUNTER
Called spoke with patient verified with two identifiers advised her amlodipine was refilled but Dr. Allie Tony needed to know why she was taking the prednisone patient stated for her arthritis she was given it at the ED and it really helps. She takes 1 tablet daily. Advised I would let provider know and get back with her. Patient verbalized understanding.

## 2017-11-28 ENCOUNTER — OFFICE VISIT (OUTPATIENT)
Dept: INTERNAL MEDICINE CLINIC | Age: 52
End: 2017-11-28

## 2017-11-28 VITALS — RESPIRATION RATE: 16 BRPM | HEIGHT: 63 IN | BODY MASS INDEX: 28.7 KG/M2 | WEIGHT: 162 LBS

## 2017-11-28 DIAGNOSIS — M25.50 ARTHRALGIA, UNSPECIFIED JOINT: ICD-10-CM

## 2017-11-28 DIAGNOSIS — K12.0 APHTHOUS ULCER: ICD-10-CM

## 2017-11-28 DIAGNOSIS — Z11.59 NEED FOR HEPATITIS C SCREENING TEST: ICD-10-CM

## 2017-11-28 DIAGNOSIS — Z12.11 SCREEN FOR COLON CANCER: ICD-10-CM

## 2017-11-28 DIAGNOSIS — Z12.31 ENCOUNTER FOR SCREENING MAMMOGRAM FOR HIGH-RISK PATIENT: ICD-10-CM

## 2017-11-28 DIAGNOSIS — J01.00 ACUTE MAXILLARY SINUSITIS, RECURRENCE NOT SPECIFIED: Primary | ICD-10-CM

## 2017-11-28 RX ORDER — AMOXICILLIN 500 MG/1
500 CAPSULE ORAL 3 TIMES DAILY
Qty: 30 CAP | Refills: 0 | Status: SHIPPED | OUTPATIENT
Start: 2017-11-28 | End: 2018-06-15 | Stop reason: SDUPTHER

## 2017-11-28 RX ORDER — LIDOCAINE HYDROCHLORIDE 20 MG/ML
SOLUTION OROPHARYNGEAL
Qty: 1 BOTTLE | Refills: 1 | Status: SHIPPED | OUTPATIENT
Start: 2017-11-28 | End: 2017-11-30 | Stop reason: SDUPTHER

## 2017-11-28 NOTE — PROGRESS NOTES
Chief Complaint   Patient presents with    Ear Pain     a couple days now    Sore Throat    Jaw Pain       Pt preferred language for health care discussion is english. Is someone accompanying this pt? no    Is the patient using any DME equipment during OV? no    Depression Screening:  PHQ over the last two weeks 7/13/2017 3/9/2017 3/9/2017 8/25/2016 8/25/2016 8/4/2016 3/3/2016   Little interest or pleasure in doing things Not at all Not at all Not at all Not at all Not at all Not at all Not at all   Feeling down, depressed or hopeless Not at all Not at all Not at all Not at all Not at all Not at all Not at all   Total Score PHQ 2 0 0 0 0 0 0 0       Learning Assessment:  Learning Assessment 5/7/2015 5/13/2014   PRIMARY LEARNER Patient Patient   HIGHEST LEVEL OF EDUCATION - PRIMARY LEARNER  DID NOT GRADUATE HIGH SCHOOL DID NOT [facility] CAREGIVER - No   PRIMARY [address]    NEED - No   LEARNER PREFERENCE PRIMARY DEMONSTRATION DEMONSTRATION   LEARNING SPECIAL TOPICS - none   ANSWERED BY patient self   RELATIONSHIP SELF SELF       Abuse Screening:  Abuse Screening Questionnaire 11/28/2017 4/6/2015   Do you ever feel afraid of your partner? N N   Are you in a relationship with someone who physically or mentally threatens you? N N   Is it safe for you to go home? Y Y       Health Maintenance reviewed and discussed per provider. Yes    Pt is due for hep c, mwv, colonoscopy, tdap, pap smear, mammogram influenza. Please order/place referral if appropriate. Advance Directive:  1. Do you have an advance directive in place? Patient Reply:no    2. If not, would you like material regarding how to put one in place? Patient Reply: no    Coordination of Care:  1. Have you been to the ER, urgent care clinic since your last visit? Hospitalized since your last visit? no    2.  Have you seen or consulted any other health care providers outside of the [address] since your last visit? Include any pap smears or colon screening. no    Please see Red banners under Allergies, Med rec, Immunizations to remove outside inquires. All correct information has been verified with patient and added to chart.        Medication's patient's would liked removed:  depakote, singulair, xyzal.

## 2017-11-28 NOTE — MR AVS SNAPSHOT
Visit Information Date & Time Provider Department Dept. Phone Encounter #  
 11/28/2017  3:45 PM Hannah Robert [facility] [phone] [id number] Follow-up Instructions Return in about 6 weeks (around 1/9/2018) for Medicare Wellness Visit, Well Woman Exam.  
  
Your Appointments 11/28/2017  3:45 PM  
Office Visit with MD LENIN Gerard [facility]) Appt Note: Medication Evaluation Prednisone [address]  
  
   
 [address] [facility] Upcoming Health Maintenance Date Due Hepatitis C Screening 1965 MEDICARE YEARLY EXAM 4/30/1981 COLONOSCOPY 4/30/1981 PAP AKA CERVICAL CYTOLOGY 4/30/1984 BREAST CANCER SCRN MAMMOGRAM 4/30/2013 Pneumococcal 19-64 Medium Risk (1 of 1 - PPSV23) 12/28/2017* DTaP/Tdap/Td series (1 - Tdap) 11/28/2018* *Topic was postponed. The date shown is not the original due date. Allergies as of 11/28/2017  Review Complete On: 11/28/2017 By: Valarie Stephen Severity Noted Reaction Type Reactions Hydrocodone-acetaminophen  08/25/2016    Other (comments) Current Immunizations  Reviewed on 11/22/2017 No immunizations on file. Not reviewed this visit You Were Diagnosed With   
  
 Codes Comments Acute maxillary sinusitis, recurrence not specified    -  Primary ICD-10-CM: J01.00 ICD-9-CM: 461.0 Aphthous ulcer     ICD-10-CM: K12.0 ICD-9-CM: 528.2 Arthralgia, unspecified joint     ICD-10-CM: M25.50 ICD-9-CM: 719.40 Need for hepatitis C screening test     ICD-10-CM: Z11.59 
ICD-9-CM: V73.89 Screen for colon cancer     ICD-10-CM: Z12.11 ICD-9-CM: V76.51 Encounter for screening mammogram for high-risk patient     ICD-10-CM: Z12.31 
ICD-9-CM: V76.11 Vitals Resp Height(growth percentile) Weight(growth percentile) BMI OB Status Smoking Status 16 5' 3\" (1.6 m) 162 lb (73.5 kg) 28.7 kg/m2 Postmenopausal Current Every Day Smoker BMI and BSA Data Body Mass Index Body Surface Area 28.7 kg/m 2 1.81 m 2 Preferred Pharmacy Pharmacy Name Phone [facility] [address], [address] [phone] Your Updated Medication List  
  
   
This list is accurate as of: 11/28/17  2:48 PM.  Always use your most recent med list. amLODIPine 10 mg tablet Commonly known as:  Elsy Blade Take 1 Tab by mouth daily. Indications: hypertension  
  
 amoxicillin 500 mg capsule Commonly known as:  AMOXIL Take 1 Cap by mouth three (3) times daily for 10 days. divalproex  mg tablet Commonly known as:  DEPAKOTE Take 1 Tab by mouth three (3) times daily. furosemide 40 mg tablet Commonly known as:  LASIX Take 1/2 to 1 every day as needed for swelling  Indications: Edema  
  
 ibuprofen 800 mg tablet Commonly known as:  MOTRIN Take 1 Tab by mouth every eight (8) hours as needed for Pain.  
  
 levocetirizine 5 mg tablet Commonly known as:  Christiano Balk Take 1 Tab by mouth daily. Indications: URTICARIA  
  
 lidocaine 2 % solution Commonly known as:  XYLOCAINE Mix 1/2 teaspoon with maalox or mylanta and swish and spit 4 times daily as needed  
  
 montelukast 10 mg tablet Commonly known as:  SINGULAIR Take 1 Tab by mouth daily. polyethylene glycol 17 gram/dose powder Commonly known as:  Henrey Judy Take 17 g by mouth daily. predniSONE 10 mg tablet Commonly known as:  DELTASONE  
10 mg.  
  
  
  
  
Prescriptions Sent to Pharmacy Refills  
 amoxicillin (AMOXIL) 500 mg capsule 0 Sig: Take 1 Cap by mouth three (3) times daily for 10 days. Class: Normal  
 Pharmacy: [address], [address] Ph #: [phone]  Route: Oral  
 lidocaine (XYLOCAINE) 2 % solution 1 Sig: Mix 1/2 teaspoon with maalox or mylanta and swish and spit 4 times daily as needed Class: Normal  
 Pharmacy: [facility] [address], [address]  #: [phone] Follow-up Instructions Return in about 6 weeks (around 1/9/2018) for Medicare Wellness Visit, Well Woman Exam. To-Do List   
 11/28/2017 Lab:  SHAHBAZ QL, W/REFLEX CASCADE   
  
 11/28/2017 Lab:  C REACTIVE PROTEIN, QT   
  
 11/28/2017 Lab:  CBC WITH AUTOMATED DIFF   
  
 11/28/2017 Lab:  CYCLIC CITRUL PEPTIDE AB, IGG   
  
 11/28/2017 Lab:  HCV AB W/RFLX TO SUZAN Around 11/28/2017 Imaging:  MASSIEL MAMMO BI SCREENING INCL CAD   
  
 11/28/2017 Lab:  OCCULT BLOOD, IMMUNOASSAY (FIT)   
  
 11/28/2017 Lab:  RHEUMATOID FACTOR, QL Introducing [facility] & [facility] SERVICES! [facility] introduces [employer] patient portal. Now you can access parts of your medical record, email your doctor's office, and request medication refills online. 1. In your internet browser, go to [email or web address] 2. Click on the First Time User? Click Here link in the Sign In box. You will see the New Member Sign Up page. 3. Enter your [employer] Access Code exactly as it appears below. You will not need to use this code after youve completed the sign-up process. If you do not sign up before the expiration date, you must request a new code. · [employer] Access Code: [email or web address] Expires: 1/11/2018 12:52 PM 
 
4. Enter the last four digits of your Social Security Number (xxxx) and Date of Birth (mm/dd/yyyy) as indicated and click Submit. You will be taken to the next sign-up page. 5. Create a [employer]eterat ID. This will be your [employer] login ID and cannot be changed, so think of one that is secure and easy to remember. 6. Create a [employer] password. You can change your password at any time. 7. Enter your Password Reset Question and Answer. This can be used at a later time if you forget your password. 8. Enter your e-mail address. You will receive e-mail notification when new information is available in [address] 9. Click Sign Up. You can now view and download portions of your medical record. 10. Click the Download Summary menu link to download a portable copy of your medical information. If you have questions, please visit the Frequently Asked Questions section of the [employer] website. Remember, [employer] is NOT to be used for urgent needs. For medical emergencies, dial 911. Now available from your iPhone and Android! Please provide this summary of care documentation to your next provider. Your primary care clinician is listed as [facility]. If you have any questions after today's visit, please call [phone].

## 2017-11-28 NOTE — PROGRESS NOTES
HISTORY OF PRESENT ILLNESS  Elvira Flower is a 45 y.o. female. Visit Vitals    Resp 16    Ht 5' 3\" (1.6 m)    Wt 162 lb (73.5 kg)    BMI 28.7 kg/m2       HPI Comments: For about a week has had some sinus pain and congestion. Ears hurt. Neck hurts, head hurts. Gums swollen  Mild cough. Some burning in the center exp with cough  Other family members have been sick    Colgate toothpaste helps some. URI    The history is provided by the patient. This is a new problem. The current episode started more than 1 week ago. The problem has not changed since onset. There has been no fever. Associated symptoms include congestion, ear pain, sore throat, swollen glands, neck pain and cough. Review of Systems   Constitutional: Negative for chills and fever. HENT: Positive for congestion, ear pain and sore throat. Respiratory: Positive for cough. Cardiovascular: Negative. Musculoskeletal: Positive for neck pain. Physical Exam   Constitutional: She is oriented to person, place, and time. She appears well-developed and well-nourished. No distress. HENT:   Right Ear: Tympanic membrane, external ear and ear canal normal.   Left Ear: Tympanic membrane, external ear and ear canal normal.   Nose: Mucosal edema present. Right sinus exhibits maxillary sinus tenderness. Right sinus exhibits no frontal sinus tenderness. Left sinus exhibits maxillary sinus tenderness. Left sinus exhibits no frontal sinus tenderness. Mouth/Throat: Uvula is midline. Posterior oropharyngeal erythema present. Cardiovascular: Normal rate and regular rhythm. Pulmonary/Chest: Effort normal and breath sounds normal.   Musculoskeletal: She exhibits no edema. Neurological: She is alert and oriented to person, place, and time. Skin: Skin is warm and dry. She is not diaphoretic. Psychiatric: She has a normal mood and affect. Nursing note and vitals reviewed. ASSESSMENT and PLAN    ICD-10-CM ICD-9-CM    1.  Acute maxillary sinusitis, recurrence not specified J01.00 461.0 CBC WITH AUTOMATED DIFF      amoxicillin (AMOXIL) 500 mg capsule   2. Aphthous ulcer K12.0 528.2 lidocaine (XYLOCAINE) 2 % solution   3. Arthralgia, unspecified joint M25.50 719.40 C REACTIVE PROTEIN, QT      CBC WITH AUTOMATED DIFF      SHAHBAZ QL, W/REFLEX CASCADE      RHEUMATOID FACTOR, QL      CYCLIC CITRUL PEPTIDE AB, IGG   4. Need for hepatitis C screening test Z11.59 V73.89 HCV AB W/RFLX TO SUZAN   5. Screen for colon cancer Z12.11 V76.51 OCCULT BLOOD, IMMUNOASSAY (FIT)   6. Encounter for screening mammogram for high-risk patient Z12.31 V76.11 MASSIEL MAMMO BI SCREENING INCL CAD       Will tx sinus sxs with antibiotic    will check lab due to body and joint aches    Discussed BMI/weight, lifestyle, diet and exercise    F/u first of the year for [address] with [facility] visit.

## 2017-11-29 ENCOUNTER — TELEPHONE (OUTPATIENT)
Dept: INTERNAL MEDICINE CLINIC | Age: 52
End: 2017-11-29

## 2017-11-29 NOTE — TELEPHONE ENCOUNTER
Patient called pharmacy they are saying they received the script for her antibiotic but not the lidocaine (XYLOCAINE) 2 % solution. Patient request return phone call with solution.

## 2017-11-29 NOTE — TELEPHONE ENCOUNTER
[facility] called in regards to this matter. They say they cannot find it and ask if the request can be resent.

## 2017-11-30 DIAGNOSIS — K12.0 APHTHOUS ULCER: ICD-10-CM

## 2017-11-30 RX ORDER — LIDOCAINE HYDROCHLORIDE 20 MG/ML
SOLUTION OROPHARYNGEAL
Qty: 1 BOTTLE | Refills: 1 | Status: SHIPPED | OUTPATIENT
Start: 2017-11-30 | End: 2017-12-01 | Stop reason: SDUPTHER

## 2017-11-30 NOTE — TELEPHONE ENCOUNTER
Patient called in stating the pharmacy has not received the Rx for the lidocaine solution. I called to confirm with pharmacy and they stated the only receive the Rx for the antibiotic. Please resend Rx for lidocaine solution. Thank you.

## 2017-12-01 RX ORDER — LIDOCAINE HYDROCHLORIDE 20 MG/ML
SOLUTION OROPHARYNGEAL
Qty: 1 BOTTLE | Refills: 1 | Status: SHIPPED | OUTPATIENT
Start: 2017-12-01 | End: 2018-03-05 | Stop reason: SDUPTHER

## 2017-12-03 LAB
ABSOLUTE LYMPHOCYTE COUNT, 10803: 2.3 K/UL (ref 1–4.8)
ANA SCREEN, 8017: NEGATIVE
BASOPHILS # BLD: 0 K/UL (ref 0–0.2)
BASOPHILS NFR BLD: 0 % (ref 0–2)
C-REACTIVE PROTEIN, QT, 006627: 0.1 MG/DL (ref 0–0.5)
CCP ANTIBODY IGG,99138601: <0.5 U/ML
EOSINOPHIL # BLD: 0.1 K/UL (ref 0–0.5)
EOSINOPHIL NFR BLD: 1 % (ref 0–6)
ERYTHROCYTE [DISTWIDTH] IN BLOOD BY AUTOMATED COUNT: 14.9 % (ref 10–16)
GRANULOCYTES,GRANS: 56 % (ref 40–75)
HCT VFR BLD AUTO: 41.3 % (ref 35.1–48)
HCV AB SER IA-ACNC: NORMAL
HGB BLD-MCNC: 13.5 G/DL (ref 11.7–16)
LYMPHOCYTES, LYMLT: 37 % (ref 27–45)
MCH RBC QN AUTO: 31 PG (ref 26–34)
MCHC RBC AUTO-ENTMCNC: 33 G/DL (ref 32–36)
MCV RBC AUTO: 95 FL (ref 80–95)
MONOCYTES # BLD: 0.3 K/UL (ref 0.1–0.9)
MONOCYTES NFR BLD: 5 % (ref 3–9)
NEUTROPHILS # BLD AUTO: 3.5 K/UL (ref 1.8–7.7)
PLATELET # BLD AUTO: 310 K/UL (ref 140–440)
PMV BLD AUTO: 10.2 FL (ref 6–10.8)
RBC # BLD AUTO: 4.36 M/UL (ref 3.8–5.2)
RHEUMATOID FACTOR QUANT, IMMUNOTURBIDIMETRIC: <20 IU/ML (ref 0–20)
WBC # BLD AUTO: 6.3 K/UL (ref 4–11)

## 2017-12-20 NOTE — TELEPHONE ENCOUNTER
Patient called to ask if she could utilize the Lidocaine solution without the over the counter medication suggested. Patient stated she thought it was mylanta. Patient would like call back to discuss.

## 2017-12-20 NOTE — TELEPHONE ENCOUNTER
Yes, she can. It she still has an ulcer, the mylanta makes it coat better. But she does not have to use that if she doesn't feel the need right now.

## 2017-12-21 NOTE — TELEPHONE ENCOUNTER
Attempted to contact pt at  number, no answer. Lvm for pt to return call to office at [phone]. Will continue to try to contact pt.

## 2017-12-22 NOTE — TELEPHONE ENCOUNTER
Attempted to contact pt at  number, no answer. Lvm on secure line for pt per Dr Sapna Gaines notes and to return call to  office at [phone] if further clarification was needed. Will close encounter.

## 2018-02-07 ENCOUNTER — OFFICE VISIT (OUTPATIENT)
Dept: INTERNAL MEDICINE CLINIC | Age: 53
End: 2018-02-07

## 2018-02-07 ENCOUNTER — TELEPHONE (OUTPATIENT)
Dept: INTERNAL MEDICINE CLINIC | Age: 53
End: 2018-02-07

## 2018-02-07 VITALS
BODY MASS INDEX: 28.35 KG/M2 | DIASTOLIC BLOOD PRESSURE: 90 MMHG | HEIGHT: 63 IN | WEIGHT: 160 LBS | RESPIRATION RATE: 17 BRPM | OXYGEN SATURATION: 99 % | HEART RATE: 63 BPM | SYSTOLIC BLOOD PRESSURE: 132 MMHG | TEMPERATURE: 98 F

## 2018-02-07 DIAGNOSIS — K13.79 MOUTH PAIN: ICD-10-CM

## 2018-02-07 DIAGNOSIS — R05.9 COUGH: ICD-10-CM

## 2018-02-07 DIAGNOSIS — J06.9 URI, ACUTE: ICD-10-CM

## 2018-02-07 DIAGNOSIS — J11.1 INFLUENZA: ICD-10-CM

## 2018-02-07 DIAGNOSIS — R52 BODY ACHES: Primary | ICD-10-CM

## 2018-02-07 RX ORDER — CETIRIZINE HYDROCHLORIDE, PSEUDOEPHEDRINE HYDROCHLORIDE 5; 120 MG/1; MG/1
1 TABLET, FILM COATED, EXTENDED RELEASE ORAL 2 TIMES DAILY
Qty: 30 TAB | Refills: 0 | Status: CANCELLED | OUTPATIENT
Start: 2018-02-07

## 2018-02-07 RX ORDER — AZITHROMYCIN 250 MG/1
TABLET, FILM COATED ORAL
Qty: 6 TAB | Refills: 0 | Status: CANCELLED | OUTPATIENT
Start: 2018-02-07

## 2018-02-07 RX ORDER — IBUPROFEN 200 MG
200 TABLET ORAL
Qty: 20 TAB | Refills: 0 | Status: SHIPPED | OUTPATIENT
Start: 2018-02-07 | End: 2018-03-13 | Stop reason: SDUPTHER

## 2018-02-07 RX ORDER — OSELTAMIVIR PHOSPHATE 75 MG/1
75 CAPSULE ORAL 2 TIMES DAILY
Qty: 10 CAP | Refills: 0 | Status: SHIPPED | OUTPATIENT
Start: 2018-02-07 | End: 2018-02-12

## 2018-02-07 NOTE — PROGRESS NOTES
ROOM # 1    Jesu Sánchez presents today for   Chief Complaint   Patient presents with    Mouth Pain     x 2 weeks . Medication solution not working    Delayne Mellow Generalized Body Aches    Cough       Jesu Sánchez preferred language for health care discussion is english/other. Is someone accompanying this pt? no    Is the patient using any DME equipment during OV? no    Depression Screening:  PHQ over the last two weeks 7/13/2017 3/9/2017 3/9/2017 8/25/2016 8/25/2016 8/4/2016 3/3/2016   Little interest or pleasure in doing things Not at all Not at all Not at all Not at all Not at all Not at all Not at all   Feeling down, depressed or hopeless Not at all Not at all Not at all Not at all Not at all Not at all Not at all   Total Score PHQ 2 0 0 0 0 0 0 0       Learning Assessment:  Learning Assessment 5/7/2015 5/13/2014   PRIMARY LEARNER Patient Patient   HIGHEST LEVEL OF EDUCATION - PRIMARY LEARNER  DID NOT GRADUATE HIGH SCHOOL DID NOT [facility] CAREGIVER - No   PRIMARY [address]    NEED - No   LEARNER PREFERENCE PRIMARY DEMONSTRATION DEMONSTRATION   LEARNING SPECIAL TOPICS - none   ANSWERED BY patient self   RELATIONSHIP SELF SELF       Abuse Screening:  Abuse Screening Questionnaire 11/28/2017 4/6/2015   Do you ever feel afraid of your partner? N N   Are you in a relationship with someone who physically or mentally threatens you? N N   Is it safe for you to go home? Jairo Melendez       Fall Risk  n/i    Health Maintenance reviewed and discussed per provider. Yes    Jesu Sánchez is due for nothing at this time. Please order/place referral if appropriate. Advance Directive:  1. Do you have an advance directive in place? Patient Reply: no    2. If not, would you like material regarding how to put one in place? Patient Reply: no    Coordination of Care:  1. Have you been to the ER, urgent care clinic since your last visit? Hospitalized since your last visit? no    2. Have you seen or consulted any other health care providers outside of the [address] [place] [address] since your last visit? Include any pap smears or colon screening.  no

## 2018-02-07 NOTE — TELEPHONE ENCOUNTER
Contacted PA dept listed [facility]. Two patient Identifiers confirmed. Per  pt would not found in their system. Will contact pharmacy.

## 2018-02-07 NOTE — TELEPHONE ENCOUNTER
Dann Lundberg was advised pt switched medication order to [facility] because they did not have medication in stock. Two patient Identifiers confirmed. She stated they had [facility] Medicaid for pts insurance with ID: [id number]. Will f/u.

## 2018-02-07 NOTE — PROGRESS NOTES
Patient of Dr Christopher Rodriguez, presents for mouth pain, HA, cough,generalized body aches x 2 weeks. States he grand children were sick but not sure if they had the flu. She was prescribed Lidocaine solution which she has been using for her mouth pain. Patient denies any dizziness,SOb,CP,abd pain, fever,chills. Chief Complaint   Patient presents with    Mouth Pain     x 2 weeks . Medication solution not working     Generalized Body Aches    Cough       HPI:     Murtaza East is a 45 y.o.  female with history of   here for the above complaint. Past Medical History:   Diagnosis Date    Anxiety     Bipolar 1 disorder ([facility] [address]     Burn     Foot pain     Gallstone     cholecystostomy    HTN (hypertension)      Past Surgical History:   Procedure Laterality Date    HX FREE SKIN GRAFT      HX OTHER SURGICAL      cholecystostomy     Current Outpatient Prescriptions   Medication Sig    oseltamivir (TAMIFLU) 75 mg capsule Take 1 Cap by mouth two (2) times a day for 5 days.  ibuprofen (MOTRIN) 200 mg tablet Take 1 Tab by mouth every six (6) hours as needed for Pain.  lidocaine (XYLOCAINE) 2 % solution Mix 1/2 teaspoon with maalox or mylanta and swish and spit 4 times daily as needed    amLODIPine (NORVASC) 10 mg tablet Take 1 Tab by mouth daily. Indications: hypertension    furosemide (LASIX) 40 mg tablet Take 1/2 to 1 every day as needed for swelling  Indications: Edema    polyethylene glycol (MIRALAX) 17 gram/dose powder Take 17 g by mouth daily.  montelukast (SINGULAIR) 10 mg tablet Take 1 Tab by mouth daily.  levocetirizine (XYZAL) 5 mg tablet Take 1 Tab by mouth daily. Indications: URTICARIA    ibuprofen (MOTRIN) 800 mg tablet Take 1 Tab by mouth every eight (8) hours as needed for Pain.  divalproex DR (DEPAKOTE) 250 mg tablet Take 1 Tab by mouth three (3) times daily.  predniSONE (DELTASONE) 10 mg tablet 10 mg.      No current facility-administered medications for this visit. Health Maintenance   Topic Date Due    MEDICARE YEARLY EXAM  04/30/1981    COLONOSCOPY  04/30/1981    Pneumococcal 19-64 Medium Risk (1 of 1 - PPSV23) 04/30/1982    PAP AKA CERVICAL CYTOLOGY  04/30/1984    BREAST CANCER SCRN MAMMOGRAM  04/30/2013    DTaP/Tdap/Td series (1 - Tdap) 11/28/2018 (Originally 4/30/1984)    Hepatitis C Screening  Completed    Influenza Age 5 to Adult  Addressed       There is no immunization history on file for this patient. No LMP recorded. Patient is postmenopausal.        Allergies and Intolerances: Allergies   Allergen Reactions    Hydrocodone-Acetaminophen Other (comments)       Family History:   Family History   Problem Relation Age of Onset    Hypertension Mother     Diabetes Mother     Heart Disease Mother     Diabetes Sister     Hypertension Sister     Hypertension Brother     Hypertension Sister        Social History:   She  reports that she has been smoking. She has been smoking about 0.25 packs per day.  She has never used smokeless tobacco.  She  reports that she drinks about 0.5 oz of alcohol per week               ROS:  · General: Positive for not feeling well, gen body aches  · HEENT:+ for mouth pain  · Respiratory: no cough, shortness of breath, or wheezing  · Cardiovascular: no chest pain, palpitations, or dyspnea on exertion; no orthopnea or PND  · Gastrointestinal: no abdominal pain, N/V, change in bowel habits,  black or bloody stools, constipation or diarrhea  · Musculoskeletal: Gen body aches  · Neurological: no numbness, tingling, headache or dizziness  · Psychological: negative for - anxiety, depression, sleep disturbances, suicidal or homicidal ideations    OBJECTIVE:   Physical exam:   Visit Vitals    /90 (BP 1 Location: Right arm, BP Patient Position: Sitting)    Pulse 63    Temp 98 °F (36.7 °C) (Oral)    Resp 17    Ht 5' 3\" (1.6 m)    Wt 160 lb (72.6 kg)    SpO2 99%    BMI 28.34 kg/m2        Generally: Pleasant female in no acute distress  HEENT Exam: Head: atraumatic, acephalic                Eyes: PERRLA     Ears: bilaterally normal TM, no erythema or exudate, normal light reflex    Nares: mucosal membranes moist, no erythema    Mouth: Clear, no erythema or exudate    Neck: supple, no LAD    Cardiac Exam: regular, rate, and rhythm. Normal S1 and S2. No murmurs, gallops, or rubs  Pulmonary exam: Clear to auscultation bilaterally  Abdominal exam: Positive bowel sounds in all four quadrants, soft, nondistended, nontender  Extremities: 2+ dorsalis pedis pulses bilaterally. No pedal edema    bilaterally    LABS/RADIOLOGICAL TESTS:  Lab Results   Component Value Date/Time    WBC 6.3 11/28/2017 03:00 PM    HGB 13.5 11/28/2017 03:00 PM    HCT 41.3 11/28/2017 03:00 PM    PLATELET 008 78/31/5917 03:00 PM     Lab Results   Component Value Date/Time    Sodium 139 07/13/2017 12:24 PM    Potassium 4.5 07/13/2017 12:24 PM    Chloride 104 07/13/2017 12:24 PM    CO2 23 07/13/2017 12:24 PM    Glucose 112 (H) 07/13/2017 12:24 PM    BUN 20 (H) 07/13/2017 12:24 PM    Creatinine 0.90 07/13/2017 12:24 PM     Lab Results   Component Value Date/Time    Cholesterol, total 261 (H) 04/06/2015 12:08 PM    HDL Cholesterol 123 04/06/2015 12:08 PM    LDL, calculated 117 (H) 04/06/2015 12:08 PM    Triglyceride 104 04/06/2015 12:08 PM     No results found for: GPT    All lab results and radiological studies were reviewed and discussed with the patient. ASSESSMENT/PLAN:    Diagnoses and all orders for this visit:    1. Body aches  -     oseltamivir (TAMIFLU) 75 mg capsule; Take 1 Cap by mouth two (2) times a day for 5 days. -     ibuprofen (MOTRIN) 200 mg tablet; Take 1 Tab by mouth every six (6) hours as needed for Pain. 2. Cough  -     oseltamivir (TAMIFLU) 75 mg capsule; Take 1 Cap by mouth two (2) times a day for 5 days. -     ibuprofen (MOTRIN) 200 mg tablet; Take 1 Tab by mouth every six (6) hours as needed for Pain.     3. Mouth pain  - ibuprofen (MOTRIN) 200 mg tablet; Take 1 Tab by mouth every six (6) hours as needed for Pain. 4. URI, acute  -     oseltamivir (TAMIFLU) 75 mg capsule; Take 1 Cap by mouth two (2) times a day for 5 days. -     ibuprofen (MOTRIN) 200 mg tablet; Take 1 Tab by mouth every six (6) hours as needed for Pain. 5. Influenza        Requested Prescriptions     Signed Prescriptions Disp Refills    oseltamivir (TAMIFLU) 75 mg capsule 10 Cap 0     Sig: Take 1 Cap by mouth two (2) times a day for 5 days.  ibuprofen (MOTRIN) 200 mg tablet 20 Tab 0     Sig: Take 1 Tab by mouth every six (6) hours as needed for Pain. Patient verbalized understanding and agreement with the plan. Patient was given an after-visit summary. Follow-up Disposition: Not on File or sooner if worsening symptoms.         Nicci Brar

## 2018-02-07 NOTE — PATIENT INSTRUCTIONS
Upper Respiratory Infection (Cold): Care Instructions  Your Care Instructions    An upper respiratory infection, or URI, is an infection of the nose, sinuses, or throat. URIs are spread by coughs, sneezes, and direct contact. The common cold is the most frequent kind of URI. The flu and sinus infections are other kinds of URIs. Almost all URIs are caused by viruses. Antibiotics won't cure them. But you can treat most infections with home care. This may include drinking lots of fluids and taking over-the-counter pain medicine. You will probably feel better in 4 to 10 days. The doctor has checked you carefully, but problems can develop later. If you notice any problems or new symptoms, get medical treatment right away. Follow-up care is a key part of your treatment and safety. Be sure to make and go to all appointments, and call your doctor if you are having problems. It's also a good idea to know your test results and keep a list of the medicines you take. How can you care for yourself at home? · To prevent dehydration, drink plenty of fluids, enough so that your urine is light yellow or clear like water. Choose water and other caffeine-free clear liquids until you feel better. If you have kidney, heart, or liver disease and have to limit fluids, talk with your doctor before you increase the amount of fluids you drink. · Take an over-the-counter pain medicine, such as acetaminophen (Tylenol), ibuprofen (Advil, Motrin), or naproxen (Aleve). Read and follow all instructions on the label. · Before you use cough and cold medicines, check the label. These medicines may not be safe for young children or for people with certain health problems. · Be careful when taking over-the-counter cold or flu medicines and Tylenol at the same time. Many of these medicines have acetaminophen, which is Tylenol. Read the labels to make sure that you are not taking more than the recommended dose.  Too much acetaminophen (Tylenol) can be harmful. · Get plenty of rest.  · Do not smoke or allow others to smoke around you. If you need help quitting, talk to your doctor about stop-smoking programs and medicines. These can increase your chances of quitting for good. When should you call for help? Call 911 anytime you think you may need emergency care. For example, call if:  ? · You have severe trouble breathing. ?Call your doctor now or seek immediate medical care if:  ? · You seem to be getting much sicker. ? · You have new or worse trouble breathing. ? · You have a new or higher fever. ? · You have a new rash. ? Watch closely for changes in your health, and be sure to contact your doctor if:  ? · You have a new symptom, such as a sore throat, an earache, or sinus pain. ? · You cough more deeply or more often, especially if you notice more mucus or a change in the color of your mucus. ? · You do not get better as expected. Where can you learn more? Go to http://ru-gigi.info/. Enter D077 in the search box to learn more about \"Upper Respiratory Infection (Cold): Care Instructions. \"  Current as of: May 12, 2017  Content Version: 11.4  © 0201-0588 [employer]. Care instructions adapted under license by [employer] (which disclaims liability or warranty for this information). If you have questions about a medical condition or this instruction, always ask your healthcare professional. [place] [address] any warranty or liability for your use of this information. Cough: Care Instructions  Your Care Instructions    A cough is your body's response to something that bothers your throat or airways. Many things can cause a cough. You might cough because of a cold or the flu, bronchitis, or asthma. Smoking, postnasal drip, allergies, and stomach acid that backs up into your throat also can cause coughs. A cough is a symptom, not a disease.  Most coughs stop when the cause, such as a cold, goes away. You can take a few steps at home to cough less and feel better. Follow-up care is a key part of your treatment and safety. Be sure to make and go to all appointments, and call your doctor if you are having problems. It's also a good idea to know your test results and keep a list of the medicines you take. How can you care for yourself at home? · Drink lots of water and other fluids. This helps thin the mucus and soothes a dry or sore throat. Honey or lemon juice in hot water or tea may ease a dry cough. · Take cough medicine as directed by your doctor. · Prop up your head on pillows to help you breathe and ease a dry cough. · Try cough drops to soothe a dry or sore throat. Cough drops don't stop a cough. Medicine-flavored cough drops are no better than candy-flavored drops or hard candy. · Do not smoke. Avoid secondhand smoke. If you need help quitting, talk to your doctor about stop-smoking programs and medicines. These can increase your chances of quitting for good. When should you call for help? Call 911 anytime you think you may need emergency care. For example, call if:  ? · You have severe trouble breathing. ?Call your doctor now or seek immediate medical care if:  ? · You cough up blood. ? · You have new or worse trouble breathing. ? · You have a new or higher fever. ? · You have a new rash. ? Watch closely for changes in your health, and be sure to contact your doctor if:  ? · You cough more deeply or more often, especially if you notice more mucus or a change in the color of your mucus. ? · You have new symptoms, such as a sore throat, an earache, or sinus pain. ? · You do not get better as expected. Where can you learn more? Go to http://ru-gigi.info/. Enter D279 in the search box to learn more about \"Cough: Care Instructions. \"  Current as of:  May 12, 2017  Content Version: 11.4  © 6971-8215 [employer]. Care instructions adapted under license by [employer] (which disclaims liability or warranty for this information). If you have questions about a medical condition or this instruction, always ask your healthcare professional. [address] any warranty or liability for your use of this information. Influenza (Flu): Care Instructions  Your Care Instructions    Influenza (flu) is an infection in the lungs and breathing passages. It is caused by the influenza virus. There are different strains, or types, of the flu virus from year to year. Unlike the common cold, the flu comes on suddenly and the symptoms, such as a cough, congestion, fever, chills, fatigue, aches, and pains, are more severe. These symptoms may last up to 10 days. Although the flu can make you feel very sick, it usually doesn't cause serious health problems. Home treatment is usually all you need for flu symptoms. But your doctor may prescribe antiviral medicine to prevent other health problems, such as pneumonia, from developing. Older people and those who have a long-term health condition, such as lung disease, are most at risk for having pneumonia or other health problems. Follow-up care is a key part of your treatment and safety. Be sure to make and go to all appointments, and call your doctor if you are having problems. It's also a good idea to know your test results and keep a list of the medicines you take. How can you care for yourself at home? · Get plenty of rest.  · Drink plenty of fluids, enough so that your urine is light yellow or clear like water. If you have kidney, heart, or liver disease and have to limit fluids, talk with your doctor before you increase the amount of fluids you drink. · Take an over-the-counter pain medicine if needed, such as acetaminophen (Tylenol), ibuprofen (Advil, Motrin), or naproxen (Aleve), to relieve fever, headache, and muscle aches.  Read and follow all instructions on the label. No one younger than 20 should take aspirin. It has been linked to Reye syndrome, a serious illness. · Do not smoke. Smoking can make the flu worse. If you need help quitting, talk to your doctor about stop-smoking programs and medicines. These can increase your chances of quitting for good. · Breathe moist air from a hot shower or from a sink filled with hot water to help clear a stuffy nose. · Before you use cough and cold medicines, check the label. These medicines may not be safe for young children or for people with certain health problems. · If the skin around your nose and lips becomes sore, put some petroleum jelly on the area. · To ease coughing:  ¨ Drink fluids to soothe a scratchy throat. ¨ Suck on cough drops or plain hard candy. ¨ Take an over-the-counter cough medicine that contains dextromethorphan to help you get some sleep. Read and follow all instructions on the label. ¨ Raise your head at night with an extra pillow. This may help you rest if coughing keeps you awake. · Take any prescribed medicine exactly as directed. Call your doctor if you think you are having a problem with your medicine. To avoid spreading the flu  · Wash your hands regularly, and keep your hands away from your face. · Stay home from school, work, and other public places until you are feeling better and your fever has been gone for at least 24 hours. The fever needs to have gone away on its own without the help of medicine. · Ask people living with you to talk to their doctors about preventing the flu. They may get antiviral medicine to keep from getting the flu from you. · To prevent the flu in the future, get a flu vaccine every fall. Encourage people living with you to get the vaccine. · Cover your mouth when you cough or sneeze. When should you call for help? Call 911 anytime you think you may need emergency care. For example, call if:  ? · You have severe trouble breathing. ?Call your doctor now or seek immediate medical care if:  ? · You have new or worse trouble breathing. ? · You seem to be getting much sicker. ? · You feel very sleepy or confused. ? · You have a new or higher fever. ? · You get a new rash. ? Watch closely for changes in your health, and be sure to contact your doctor if:  ? · You begin to get better and then get worse. ? · You are not getting better after 1 week. Where can you learn more? Go to http://ru-gigi.info/. Enter K730 in the search box to learn more about \"Influenza (Flu): Care Instructions. \"  Current as of: May 12, 2017  Content Version: 11.4  © 6396-7943 [employer]. Care instructions adapted under license by [employer] (which disclaims liability or warranty for this information). If you have questions about a medical condition or this instruction, always ask your healthcare professional. [address] any warranty or liability for your use of this information.

## 2018-02-07 NOTE — MR AVS SNAPSHOT
[address] [place] [address] 
 
 
 [address] [facility] [id number] 
[phone] Patient: Xu Logan MRN: [id number] Visit Information Date & Time Provider Department Dept. Phone Encounter #  
 2/7/2018 10:30 AM Michaela Olivera NP [employer] [phone] [id number] Upcoming Health Maintenance Date Due  
 MEDICARE YEARLY EXAM 4/30/1981 COLONOSCOPY 4/30/1981 Pneumococcal 19-64 Medium Risk (1 of 1 - PPSV23) 4/30/1982 PAP AKA CERVICAL CYTOLOGY 4/30/1984 BREAST CANCER SCRN MAMMOGRAM 4/30/2013 DTaP/Tdap/Td series (1 - Tdap) 11/28/2018* *Topic was postponed. The date shown is not the original due date. Allergies as of 2/7/2018  Review Complete On: 2/7/2018 By: Ketty Maynard Severity Noted Reaction Type Reactions Hydrocodone-acetaminophen  08/25/2016    Other (comments) Current Immunizations  Reviewed on 11/22/2017 No immunizations on file. Not reviewed this visit You Were Diagnosed With   
  
 Codes Comments Body aches    -  Primary ICD-10-CM: O41 ICD-9-CM: 780.96 Cough     ICD-10-CM: R05 ICD-9-CM: 786.2 Mouth pain     ICD-10-CM: K13.79 ICD-9-CM: 528.9   
 URI, acute     ICD-10-CM: J06.9 ICD-9-CM: 465.9 Influenza     ICD-10-CM: J11.1 ICD-9-CM: 487. 1 Vitals BP Pulse Temp Resp Height(growth percentile) Weight(growth percentile) 132/90 (BP 1 Location: Right arm, BP Patient Position: Sitting) 63 98 °F (36.7 °C) (Oral) 17 5' 3\" (1.6 m) 160 lb (72.6 kg) SpO2 BMI OB Status Smoking Status 99% 28.34 kg/m2 Postmenopausal Current Every Day Smoker Vitals History BMI and BSA Data Body Mass Index Body Surface Area  
 28.34 kg/m 2 1.8 m 2 Preferred Pharmacy Pharmacy Name Phone [address], [facility] [address] [phone] Your Updated Medication List  
  
   
 This list is accurate as of: 2/7/18 11:37 AM.  Always use your most recent med list. amLODIPine 10 mg tablet Commonly known as:  Elizabeth Hensen Take 1 Tab by mouth daily. Indications: hypertension  
  
 divalproex  mg tablet Commonly known as:  DEPAKOTE Take 1 Tab by mouth three (3) times daily. furosemide 40 mg tablet Commonly known as:  LASIX Take 1/2 to 1 every day as needed for swelling  Indications: Edema * ibuprofen 800 mg tablet Commonly known as:  MOTRIN Take 1 Tab by mouth every eight (8) hours as needed for Pain. * ibuprofen 200 mg tablet Commonly known as:  MOTRIN Take 1 Tab by mouth every six (6) hours as needed for Pain.  
  
 levocetirizine 5 mg tablet Commonly known as:  Mariza Moon Take 1 Tab by mouth daily. Indications: URTICARIA  
  
 lidocaine 2 % solution Commonly known as:  XYLOCAINE Mix 1/2 teaspoon with maalox or mylanta and swish and spit 4 times daily as needed  
  
 montelukast 10 mg tablet Commonly known as:  SINGULAIR Take 1 Tab by mouth daily. oseltamivir 75 mg capsule Commonly known as:  TAMIFLU Take 1 Cap by mouth two (2) times a day for 5 days. polyethylene glycol 17 gram/dose powder Commonly known as:  Olga Rudder Take 17 g by mouth daily. predniSONE 10 mg tablet Commonly known as:  DELTASONE  
10 mg.  
  
 * Notice: This list has 2 medication(s) that are the same as other medications prescribed for you. Read the directions carefully, and ask your doctor or other care provider to review them with you. Prescriptions Sent to Pharmacy Refills  
 oseltamivir (TAMIFLU) 75 mg capsule 0 Sig: Take 1 Cap by mouth two (2) times a day for 5 days. Class: Normal  
 Pharmacy: [facility] [address] AYO ANDRES [address], [address] Ph #: [phone] Route: Oral  
 ibuprofen (MOTRIN) 200 mg tablet 0 Sig: Take 1 Tab by mouth every six (6) hours as needed for Pain.   
 Class: Normal  
 Pharmacy: [address], [address]  #: [phone] Route: Oral  
  
Patient Instructions Upper Respiratory Infection (Cold): Care Instructions Your Care Instructions An upper respiratory infection, or URI, is an infection of the nose, sinuses, or throat. URIs are spread by coughs, sneezes, and direct contact. The common cold is the most frequent kind of URI. The flu and sinus infections are other kinds of URIs. Almost all URIs are caused by viruses. Antibiotics won't cure them. But you can treat most infections with home care. This may include drinking lots of fluids and taking over-the-counter pain medicine. You will probably feel better in 4 to 10 days. The doctor has checked you carefully, but problems can develop later. If you notice any problems or new symptoms, get medical treatment right away. Follow-up care is a key part of your treatment and safety. Be sure to make and go to all appointments, and call your doctor if you are having problems. It's also a good idea to know your test results and keep a list of the medicines you take. How can you care for yourself at home? · To prevent dehydration, drink plenty of fluids, enough so that your urine is light yellow or clear like water. Choose water and other caffeine-free clear liquids until you feel better. If you have kidney, heart, or liver disease and have to limit fluids, talk with your doctor before you increase the amount of fluids you drink. · Take an over-the-counter pain medicine, such as acetaminophen (Tylenol), ibuprofen (Advil, Motrin), or naproxen (Aleve). Read and follow all instructions on the label. · Before you use cough and cold medicines, check the label. These medicines may not be safe for young children or for people with certain health problems. · Be careful when taking over-the-counter cold or flu medicines and Tylenol at the same time.  Many of these medicines have acetaminophen, which is Tylenol. Read the labels to make sure that you are not taking more than the recommended dose. Too much acetaminophen (Tylenol) can be harmful. · Get plenty of rest. 
· Do not smoke or allow others to smoke around you. If you need help quitting, talk to your doctor about stop-smoking programs and medicines. These can increase your chances of quitting for good. When should you call for help? Call 911 anytime you think you may need emergency care. For example, call if: 
? · You have severe trouble breathing. ?Call your doctor now or seek immediate medical care if: 
? · You seem to be getting much sicker. ? · You have new or worse trouble breathing. ? · You have a new or higher fever. ? · You have a new rash. ? Watch closely for changes in your health, and be sure to contact your doctor if: 
? · You have a new symptom, such as a sore throat, an earache, or sinus pain. ? · You cough more deeply or more often, especially if you notice more mucus or a change in the color of your mucus. ? · You do not get better as expected. Where can you learn more? Go to http://ru-gigi.info/. Enter V656 in the search box to learn more about \"Upper Respiratory Infection (Cold): Care Instructions. \" Current as of: May 12, 2017 Content Version: 11.4 © 9824-3241 [employer]. Care instructions adapted under license by [employer] (which disclaims liability or warranty for this information). If you have questions about a medical condition or this instruction, always ask your healthcare professional. [place] [address] any warranty or liability for your use of this information. Cough: Care Instructions Your Care Instructions A cough is your body's response to something that bothers your throat or airways. Many things can cause a cough. You might cough because of a cold or the flu, bronchitis, or asthma.  Smoking, postnasal drip, allergies, and stomach acid that backs up into your throat also can cause coughs. A cough is a symptom, not a disease. Most coughs stop when the cause, such as a cold, goes away. You can take a few steps at home to cough less and feel better. Follow-up care is a key part of your treatment and safety. Be sure to make and go to all appointments, and call your doctor if you are having problems. It's also a good idea to know your test results and keep a list of the medicines you take. How can you care for yourself at home? · Drink lots of water and other fluids. This helps thin the mucus and soothes a dry or sore throat. Honey or lemon juice in hot water or tea may ease a dry cough. · Take cough medicine as directed by your doctor. · Prop up your head on pillows to help you breathe and ease a dry cough. · Try cough drops to soothe a dry or sore throat. Cough drops don't stop a cough. Medicine-flavored cough drops are no better than candy-flavored drops or hard candy. · Do not smoke. Avoid secondhand smoke. If you need help quitting, talk to your doctor about stop-smoking programs and medicines. These can increase your chances of quitting for good. When should you call for help? Call 911 anytime you think you may need emergency care. For example, call if: 
? · You have severe trouble breathing. ?Call your doctor now or seek immediate medical care if: 
? · You cough up blood. ? · You have new or worse trouble breathing. ? · You have a new or higher fever. ? · You have a new rash. ? Watch closely for changes in your health, and be sure to contact your doctor if: 
? · You cough more deeply or more often, especially if you notice more mucus or a change in the color of your mucus. ? · You have new symptoms, such as a sore throat, an earache, or sinus pain. ? · You do not get better as expected. Where can you learn more? Go to http://ru-gigi.info/. Enter D279 in the search box to learn more about \"Cough: Care Instructions. \" Current as of: May 12, 2017 Content Version: 11.4 © 5650-2677 [employer]. Care instructions adapted under license by [employer] (which disclaims liability or warranty for this information). If you have questions about a medical condition or this instruction, always ask your healthcare professional. [facility] any warranty or liability for your use of this information. Influenza (Flu): Care Instructions Your Care Instructions Influenza (flu) is an infection in the lungs and breathing passages. It is caused by the influenza virus. There are different strains, or types, of the flu virus from year to year. Unlike the common cold, the flu comes on suddenly and the symptoms, such as a cough, congestion, fever, chills, fatigue, aches, and pains, are more severe. These symptoms may last up to 10 days. Although the flu can make you feel very sick, it usually doesn't cause serious health problems. Home treatment is usually all you need for flu symptoms. But your doctor may prescribe antiviral medicine to prevent other health problems, such as pneumonia, from developing. Older people and those who have a long-term health condition, such as lung disease, are most at risk for having pneumonia or other health problems. Follow-up care is a key part of your treatment and safety. Be sure to make and go to all appointments, and call your doctor if you are having problems. It's also a good idea to know your test results and keep a list of the medicines you take. How can you care for yourself at home? · Get plenty of rest. 
· Drink plenty of fluids, enough so that your urine is light yellow or clear like water. If you have kidney, heart, or liver disease and have to limit fluids, talk with your doctor before you increase the amount of fluids you drink. · Take an over-the-counter pain medicine if needed, such as acetaminophen (Tylenol), ibuprofen (Advil, Motrin), or naproxen (Aleve), to relieve fever, headache, and muscle aches. Read and follow all instructions on the label. No one younger than 20 should take aspirin. It has been linked to Reye syndrome, a serious illness. · Do not smoke. Smoking can make the flu worse. If you need help quitting, talk to your doctor about stop-smoking programs and medicines. These can increase your chances of quitting for good. · Breathe moist air from a hot shower or from a sink filled with hot water to help clear a stuffy nose. · Before you use cough and cold medicines, check the label. These medicines may not be safe for young children or for people with certain health problems. · If the skin around your nose and lips becomes sore, put some petroleum jelly on the area. · To ease coughing: ¨ Drink fluids to soothe a scratchy throat. ¨ Suck on cough drops or plain hard candy. ¨ Take an over-the-counter cough medicine that contains dextromethorphan to help you get some sleep. Read and follow all instructions on the label. ¨ Raise your head at night with an extra pillow. This may help you rest if coughing keeps you awake. · Take any prescribed medicine exactly as directed. Call your doctor if you think you are having a problem with your medicine. To avoid spreading the flu · Wash your hands regularly, and keep your hands away from your face. · Stay home from school, work, and other public places until you are feeling better and your fever has been gone for at least 24 hours. The fever needs to have gone away on its own without the help of medicine. · Ask people living with you to talk to their doctors about preventing the flu. They may get antiviral medicine to keep from getting the flu from you. · To prevent the flu in the future, get a flu vaccine every fall. Encourage people living with you to get the vaccine. · Cover your mouth when you cough or sneeze. When should you call for help? Call 911 anytime you think you may need emergency care. For example, call if: 
? · You have severe trouble breathing. ?Call your doctor now or seek immediate medical care if: 
? · You have new or worse trouble breathing. ? · You seem to be getting much sicker. ? · You feel very sleepy or confused. ? · You have a new or higher fever. ? · You get a new rash. ? Watch closely for changes in your health, and be sure to contact your doctor if: 
? · You begin to get better and then get worse. ? · You are not getting better after 1 week. Where can you learn more? Go to http://ru-gigi.info/. Enter X883 in the search box to learn more about \"Influenza (Flu): Care Instructions. \" Current as of: May 12, 2017 Content Version: 11.4 © 5925-4602 [employer]. Care instructions adapted under license by [employer] (which disclaims liability or warranty for this information). If you have questions about a medical condition or this instruction, always ask your healthcare professional. [place] [address] any warranty or liability for your use of this information. Introducing [facility]! [facility] introduces [employer] patient portal. Now you can access parts of your medical record, email your doctor's office, and request medication refills online. 1. In your internet browser, go to [email or web address] Everyday [email or web address]/TDI [employer]line 2. Click on the First Time User? Click Here link in the Sign In box. You will see the New Member Sign Up page. 3. Enter your [employer] Access Code exactly as it appears below. You will not need to use this code after youve completed the sign-up process. If you do not sign up before the expiration date, you must request a new code. · [employer] Access Code: [email or web address] Expires: 5/8/2018 11:37 AM 
 
 4. Enter the last four digits of your Social Security Number (xxxx) and Date of Birth (mm/dd/yyyy) as indicated and click Submit. You will be taken to the next sign-up page. 5. Create a Thumbs Up ID. This will be your Thumbs Up login ID and cannot be changed, so think of one that is secure and easy to remember. 6. Create a Thumbs Up password. You can change your password at any time. 7. Enter your Password Reset Question and Answer. This can be used at a later time if you forget your password. 8. Enter your e-mail address. You will receive e-mail notification when new information is available in [address] 9. Click Sign Up. You can now view and download portions of your medical record. 10. Click the Download Summary menu link to download a portable copy of your medical information. If you have questions, please visit the Frequently Asked Questions section of the Thumbs Up website. Remember, Thumbs Up is NOT to be used for urgent needs. For medical emergencies, dial 911. Now available from your iPhone and Android! Please provide this summary of care documentation to your next provider. Your primary care clinician is listed as [facility]. If you have any questions after today's visit, please call [phone].

## 2018-02-09 NOTE — TELEPHONE ENCOUNTER
[address] Non-Preferred [address] for for Non-Preferred [phone]    PA information sent to plan. Will await response via fax.

## 2018-02-14 NOTE — TELEPHONE ENCOUNTER
Attempted to contact pt at  number, no answer. Lvm for pt to return call to office at [phone]. Will continue to try to contact pt.      Re: f/u to see if pt received medication and how she was feeling

## 2018-02-14 NOTE — TELEPHONE ENCOUNTER
[address] spoke with [place]. Two patient Identifiers confirmed. Advised medication was transferred to [facility] # [id number]. Will f/u tomorrow.

## 2018-02-15 NOTE — TELEPHONE ENCOUNTER
[facility]. Two patient Identifiers confirmed. Was advised pt was able to receive medication. No other issue noted.

## 2018-03-05 ENCOUNTER — OFFICE VISIT (OUTPATIENT)
Dept: INTERNAL MEDICINE CLINIC | Age: 53
End: 2018-03-05

## 2018-03-05 ENCOUNTER — TELEPHONE (OUTPATIENT)
Dept: INTERNAL MEDICINE CLINIC | Age: 53
End: 2018-03-05

## 2018-03-05 VITALS
HEIGHT: 63 IN | TEMPERATURE: 98.2 F | WEIGHT: 162 LBS | RESPIRATION RATE: 14 BRPM | DIASTOLIC BLOOD PRESSURE: 80 MMHG | HEART RATE: 72 BPM | OXYGEN SATURATION: 94 % | SYSTOLIC BLOOD PRESSURE: 120 MMHG | BODY MASS INDEX: 28.7 KG/M2

## 2018-03-05 DIAGNOSIS — R22.0 SWELLING OF GUMS: Primary | ICD-10-CM

## 2018-03-05 RX ORDER — LIDOCAINE HYDROCHLORIDE 20 MG/ML
SOLUTION OROPHARYNGEAL
Qty: 1 BOTTLE | Refills: 1 | Status: SHIPPED | OUTPATIENT
Start: 2018-03-05 | End: 2018-07-06

## 2018-03-05 NOTE — TELEPHONE ENCOUNTER
Pharmacy has questions regarding lidocaine (XYLOCAINE) 2 % solution. They would like to know with how much of maalox or mylanta  should be mixed with 1/2 tsp lidocaine (XYLOCAINE) 2 % solution.

## 2018-03-05 NOTE — PROGRESS NOTES
FAMILY MEDICINE CLINIC NOTE    S: The patient presents with a concern about swollen bleeding gums. She has had a problem with swollen gums before but last night noticed that her gums were bleeding. She notes pain in her gums that is making it difficult for her to eat for the last month. She does not have a regular dentist. She brushes twice a day but does not floss regularly. She does have consistent sources of vitamin C in her diet    Current Outpatient Prescriptions on File Prior to Visit   Medication Sig Dispense Refill    amLODIPine (NORVASC) 10 mg tablet Take 1 Tab by mouth daily. Indications: hypertension 30 Tab 5    furosemide (LASIX) 40 mg tablet Take 1/2 to 1 every day as needed for swelling  Indications: Edema 30 Tab 5    polyethylene glycol (MIRALAX) 17 gram/dose powder Take 17 g by mouth daily. 510 g 5    ibuprofen (MOTRIN) 800 mg tablet Take 1 Tab by mouth every eight (8) hours as needed for Pain. 90 Tab 5    ibuprofen (MOTRIN) 200 mg tablet Take 1 Tab by mouth every six (6) hours as needed for Pain. 20 Tab 0    predniSONE (DELTASONE) 10 mg tablet 10 mg.      montelukast (SINGULAIR) 10 mg tablet Take 1 Tab by mouth daily. 30 Tab 2    levocetirizine (XYZAL) 5 mg tablet Take 1 Tab by mouth daily. Indications: URTICARIA 30 Tab 2    divalproex DR (DEPAKOTE) 250 mg tablet Take 1 Tab by mouth three (3) times daily. 90 Tab 5     No current facility-administered medications on file prior to visit. Past Medical History:   Diagnosis Date    Anxiety     Bipolar 1 disorder ([facility] [address]     Burn     Foot pain     Gallstone     cholecystostomy    HTN (hypertension)        Social History     Social History    Marital status:      Spouse name: N/A    Number of children: N/A    Years of education: N/A     Occupational History    Not on file.      Social History Main Topics    Smoking status: Current Every Day Smoker     Packs/day: 0.25    Smokeless tobacco: Never Used      Comment: Pt counseled to stop smoking    Alcohol use 0.5 oz/week     1 Standard drinks or equivalent per week    Drug use: No    Sexual activity: No     Other Topics Concern    Not on file     Social History Narrative       Family History   Problem Relation Age of Onset    Hypertension Mother     Diabetes Mother     Heart Disease Mother     Diabetes Sister     Hypertension Sister     Hypertension Brother     Hypertension Sister        O:  Visit Vitals    /80 (BP 1 Location: Left arm, BP Patient Position: Sitting)    Pulse 72    Temp 98.2 °F (36.8 °C) (Oral)    Resp 14    Ht 5' 3\" (1.6 m)    Wt 162 lb (73.5 kg)    SpO2 94%    BMI 28.7 kg/m2     NAD, comfortable  Mild swelling and TTP of the maxillary gums  No sarah bleeding  No localized abscess  RRR, no murmurs  CTABL, no wheezing/ronchi/rales    45 y.o. female      ICD-10-CM ICD-9-CM    1. Swelling of gums R22.0 784.2 lidocaine (XYLOCAINE) 2 % solution  Patient advised to start flossing regularly and to purchase a waterpik water flossing device. Patient advised to see her dentist within the next few days to rule out periodontal disease.

## 2018-03-05 NOTE — MR AVS SNAPSHOT
[address] [facility], [place], operated by [facility] 
 
 
 [address] [facility] 83 [address] 
[phone] Patient: Amador Herrera MRN: [id number] ESK:1/86/[id number] Visit Information Date & Time Provider Department Dept. Phone Encounter #  
 3/5/2018 11:15 AM [address] [phone] [id number] Upcoming Health Maintenance Date Due  
 MEDICARE YEARLY EXAM 4/30/1981 COLONOSCOPY 4/30/1981 Pneumococcal 19-64 Medium Risk (1 of 1 - PPSV23) 4/30/1982 PAP AKA CERVICAL CYTOLOGY 4/30/1984 BREAST CANCER SCRN MAMMOGRAM 4/30/2013 DTaP/Tdap/Td series (1 - Tdap) 11/28/2018* *Topic was postponed. The date shown is not the original due date. Allergies as of 3/5/2018  Review Complete On: 3/5/2018 By: Roberta Hannah LPN Severity Noted Reaction Type Reactions Hydrocodone-acetaminophen  08/25/2016    Other (comments) Current Immunizations  Reviewed on 11/22/2017 No immunizations on file. Not reviewed this visit You Were Diagnosed With   
  
 Codes Comments Swelling of gums    -  Primary ICD-10-CM: R22.0 ICD-9-CM: 588. 2 Vitals BP Pulse Temp Resp Height(growth percentile) Weight(growth percentile) 120/80 (BP 1 Location: Left arm, BP Patient Position: Sitting) 72 98.2 °F (36.8 °C) (Oral) 14 5' 3\" (1.6 m) 162 lb (73.5 kg) SpO2 BMI OB Status Smoking Status 94% 28.7 kg/m2 Postmenopausal Current Every Day Smoker BMI and BSA Data Body Mass Index Body Surface Area 28.7 kg/m 2 1.81 m 2 Preferred Pharmacy Pharmacy Name Phone [address], [address] [place] [address] Ave [phone] Your Updated Medication List  
  
   
This list is accurate as of 3/5/18 11:48 AM.  Always use your most recent med list. amLODIPine 10 mg tablet Commonly known as:  Darinel Chatman Take 1 Tab by mouth daily. Indications: hypertension divalproex  mg tablet Commonly known as:  DEPAKOTE Take 1 Tab by mouth three (3) times daily. furosemide 40 mg tablet Commonly known as:  LASIX Take 1/2 to 1 every day as needed for swelling  Indications: Edema * ibuprofen 800 mg tablet Commonly known as:  MOTRIN Take 1 Tab by mouth every eight (8) hours as needed for Pain. * ibuprofen 200 mg tablet Commonly known as:  MOTRIN Take 1 Tab by mouth every six (6) hours as needed for Pain.  
  
 levocetirizine 5 mg tablet Commonly known as:  Deward Toro Take 1 Tab by mouth daily. Indications: URTICARIA  
  
 lidocaine 2 % solution Commonly known as:  XYLOCAINE Mix 1/2 teaspoon with maalox or mylanta and swish and spit 4 times daily as needed  
  
 montelukast 10 mg tablet Commonly known as:  SINGULAIR Take 1 Tab by mouth daily. polyethylene glycol 17 gram/dose powder Commonly known as:  Anupam Gael Take 17 g by mouth daily. predniSONE 10 mg tablet Commonly known as:  DELTASONE  
10 mg.  
  
 * Notice: This list has 2 medication(s) that are the same as other medications prescribed for you. Read the directions carefully, and ask your doctor or other care provider to review them with you. Prescriptions Sent to Pharmacy Refills  
 lidocaine (XYLOCAINE) 2 % solution 1 Sig: Mix 1/2 teaspoon with maalox or mylanta and swish and spit 4 times daily as needed Class: Normal  
 Pharmacy: [facility] [address] AY[address] ANDRES [address], [address] Ph #: [phone] Introducing [facility] & HEALTH SERVICES! Diana Humphreys introduces [employer] patient portal. Now you can access parts of your medical record, email your doctor's office, and request medication refills online. 1. In your internet browser, go to [email or web address] 2. Click on the First Time User? Click Here link in the Sign In box. You will see the New Member Sign Up page. 3. Enter your Ohmx Access Code exactly as it appears below. You will not need to use this code after youve completed the sign-up process. If you do not sign up before the expiration date, you must request a new code. · Ohmx Access Code: [email or web address] Expires: 5/8/2018 11:37 AM 
 
4. Enter the last four digits of your Social Security Number (xxxx) and Date of Birth (mm/dd/yyyy) as indicated and click Submit. You will be taken to the next sign-up page. 5. Create a Ohmx ID. This will be your Ohmx login ID and cannot be changed, so think of one that is secure and easy to remember. 6. Create a Ohmx password. You can change your password at any time. 7. Enter your Password Reset Question and Answer. This can be used at a later time if you forget your password. 8. Enter your e-mail address. You will receive e-mail notification when new information is available in [address] 9. Click Sign Up. You can now view and download portions of your medical record. 10. Click the Download Summary menu link to download a portable copy of your medical information. If you have questions, please visit the Frequently Asked Questions section of the Ohmx website. Remember, Ohmx is NOT to be used for urgent needs. For medical emergencies, dial 911. Now available from your iPhone and Android! Please provide this summary of care documentation to your next provider. Your primary care clinician is listed as [facility]. If you have any questions after today's visit, please call [phone].

## 2018-03-05 NOTE — PROGRESS NOTES
Patient presents to the clinic for a follow up on swollen gums. Patient complains of bleeding in her gums and pain on the left side of her face. Flu Shot Requested: no    Depression Screening:  PHQ over the last two weeks 3/5/2018 7/13/2017 3/9/2017 3/9/2017 8/25/2016 8/25/2016 8/4/2016   Little interest or pleasure in doing things Not at all Not at all Not at all Not at all Not at all Not at all Not at all   Feeling down, depressed or hopeless Not at all Not at all Not at all Not at all Not at all Not at all Not at all   Total Score PHQ 2 0 0 0 0 0 0 0       Learning Assessment:  Learning Assessment 5/7/2015 5/13/2014   PRIMARY LEARNER Patient Patient   HIGHEST LEVEL OF EDUCATION - PRIMARY LEARNER  DID NOT GRADUATE HIGH SCHOOL DID NOT [facility] CAREGIVER - No   PRIMARY [address]    NEED - No   LEARNER PREFERENCE PRIMARY DEMONSTRATION DEMONSTRATION   LEARNING SPECIAL TOPICS - none   ANSWERED BY patient self   RELATIONSHIP SELF SELF       Abuse Screening:  Abuse Screening Questionnaire 11/28/2017 4/6/2015   Do you ever feel afraid of your partner? N N   Are you in a relationship with someone who physically or mentally threatens you? N N   Is it safe for you to go home? Y Y       Health Maintenance reviewed and discussed per provider: yes     Coordination of Care:    1. Have you been to the ER, urgent care clinic since your last visit? Hospitalized since your last visit? yes    2. Have you seen or consulted any other health care providers outside of the [address] [place] [address] since your last visit? Include any pap smears or colon screening.  no

## 2018-03-06 NOTE — TELEPHONE ENCOUNTER
[address] is calling back, states they need to know what size bottle, need a specific amount, Rx can not just say 1 bottle.

## 2018-03-07 DIAGNOSIS — R22.0 SWELLING OF GUMS: ICD-10-CM

## 2018-05-23 DIAGNOSIS — J06.9 URI, ACUTE: ICD-10-CM

## 2018-05-23 DIAGNOSIS — K08.89 PAIN IN A TOOTH OR TEETH: ICD-10-CM

## 2018-05-23 DIAGNOSIS — R05.9 COUGH: ICD-10-CM

## 2018-05-23 DIAGNOSIS — K13.79 MOUTH PAIN: ICD-10-CM

## 2018-05-23 DIAGNOSIS — R52 BODY ACHES: ICD-10-CM

## 2018-05-23 DIAGNOSIS — M54.5 RIGHT LOW BACK PAIN, UNSPECIFIED CHRONICITY, WITH SCIATICA PRESENCE UNSPECIFIED: ICD-10-CM

## 2018-05-23 RX ORDER — IBUPROFEN 800 MG/1
800 TABLET ORAL
Qty: 90 TAB | Refills: 5 | Status: SHIPPED | OUTPATIENT
Start: 2018-05-23 | End: 2019-06-07 | Stop reason: SDUPTHER

## 2018-05-23 NOTE — TELEPHONE ENCOUNTER
Requested Prescriptions     Pending Prescriptions Disp Refills    ibuprofen (MOTRIN) 800 mg tablet 90 Tab 5     Sig: Take 1 Tab by mouth every eight (8) hours as needed for Pain.

## 2018-06-15 ENCOUNTER — OFFICE VISIT (OUTPATIENT)
Dept: INTERNAL MEDICINE CLINIC | Age: 53
End: 2018-06-15

## 2018-06-15 VITALS
TEMPERATURE: 97.1 F | OXYGEN SATURATION: 97 % | BODY MASS INDEX: 29.45 KG/M2 | DIASTOLIC BLOOD PRESSURE: 96 MMHG | SYSTOLIC BLOOD PRESSURE: 156 MMHG | HEIGHT: 63 IN | HEART RATE: 66 BPM | RESPIRATION RATE: 20 BRPM | WEIGHT: 166.2 LBS

## 2018-06-15 DIAGNOSIS — R22.0 SWELLING OF GUMS: ICD-10-CM

## 2018-06-15 DIAGNOSIS — K06.8 PAIN IN GUMS: Primary | ICD-10-CM

## 2018-06-15 RX ORDER — FLUCONAZOLE 150 MG/1
150 TABLET ORAL DAILY
Qty: 1 TAB | Refills: 1 | Status: SHIPPED | OUTPATIENT
Start: 2018-06-15 | End: 2018-06-16

## 2018-06-15 RX ORDER — METRONIDAZOLE 500 MG/1
500 TABLET ORAL 3 TIMES DAILY
Qty: 30 TAB | Refills: 0 | Status: SHIPPED | OUTPATIENT
Start: 2018-06-15 | End: 2018-06-25

## 2018-06-15 RX ORDER — CHLORHEXIDINE GLUCONATE 1.2 MG/ML
15 RINSE ORAL 2 TIMES DAILY
Qty: 420 ML | Refills: 0 | Status: SHIPPED | OUTPATIENT
Start: 2018-06-15 | End: 2018-07-06

## 2018-06-15 RX ORDER — AMOXICILLIN 500 MG/1
500 CAPSULE ORAL 3 TIMES DAILY
Qty: 30 CAP | Refills: 0 | Status: SHIPPED | OUTPATIENT
Start: 2018-06-15 | End: 2018-06-25

## 2018-06-15 NOTE — MR AVS SNAPSHOT
[address] [facility] 
 
 
 [address] [place] [address] [address] 
[phone] Patient: Zachery Mendes MRN: [id number] PIQ:[birth year]3 Visit Information Date & Time Provider Department Dept. Phone Encounter #  
 6/15/2018 10:00 AM Bishop Kinsey, [address] [facility] [address] [id number] Follow-up Instructions Return in about 3 weeks (around 7/6/2018) for dyspepsia. Your Appointments 6/15/2018 10:00 AM  
Office Visit with Bishop Tio MD  
[facility]) Appt Note: gums swollen; Line busy - 3 attempts - tlb; unable to reach to confirm (NS); r/s to later time [address] [place]  
  
   
 [address] [facility] Upcoming Health Maintenance Date Due COLONOSCOPY 4/30/1981 Pneumococcal 19-64 Medium Risk (1 of 1 - PPSV23) 4/30/1982 PAP AKA CERVICAL CYTOLOGY 4/30/1984 BREAST CANCER SCRN MAMMOGRAM 4/30/2013 DTaP/Tdap/Td series (1 - Tdap) 11/28/2018* Influenza Age 5 to Adult 8/1/2018 *Topic was postponed. The date shown is not the original due date. Allergies as of 6/15/2018  Review Complete On: 6/15/2018 By: Bandar Stewart LPN Severity Noted Reaction Type Reactions Hydrocodone-acetaminophen  08/25/2016    Other (comments) Current Immunizations  Reviewed on 5/14/2018 No immunizations on file. Not reviewed this visit You Were Diagnosed With   
  
 Codes Comments Pain in gums    -  Primary ICD-10-CM: K06.8 ICD-9-CM: 523.9 Swelling of gums     ICD-10-CM: R22.0 ICD-9-CM: 792. 2 Vitals BP Pulse Temp Resp Height(growth percentile) Weight(growth percentile) (!) 156/96 (BP 1 Location: Right arm, BP Patient Position: Sitting) 66 97.1 °F (36.2 °C) (Oral) 20 5' 3\" (1.6 m) 166 lb 3.2 oz (75.4 kg) SpO2 BMI OB Status Smoking Status 97% 29.44 kg/m2 Postmenopausal Current Every Day Smoker Vitals History BMI and BSA Data Body Mass Index Body Surface Area  
 29.44 kg/m 2 1.83 m 2 Preferred Pharmacy Pharmacy Name Phone [address], [address] [phone] Your Updated Medication List  
  
   
This list is accurate as of 6/15/18  9:59 AM.  Always use your most recent med list. amLODIPine 10 mg tablet Commonly known as:  Skip Newcomer Take 1 Tab by mouth daily. Indications: hypertension  
  
 amoxicillin 500 mg capsule Commonly known as:  AMOXIL Take 1 Cap by mouth three (3) times daily for 10 days. chlorhexidine 0.12 % solution Commonly known as:  PERIDEX 15 mL by Swish and Spit route two (2) times a day. fluconazole 150 mg tablet Commonly known as:  DIFLUCAN Take 1 Tab by mouth daily for 1 day. FDA advises cautious prescribing of oral fluconazole in pregnancy. furosemide 40 mg tablet Commonly known as:  LASIX Take 1/2 to 1 every day as needed for swelling  Indications: Edema * ibuprofen 200 mg tablet Commonly known as:  MOTRIN Take 1 Tab by mouth every six (6) hours as needed for Pain. * ibuprofen 800 mg tablet Commonly known as:  MOTRIN Take 1 Tab by mouth every eight (8) hours as needed for Pain.  
  
 lidocaine 2 % solution Commonly known as:  XYLOCAINE Mix 1/2 teaspoon with maalox or mylanta and swish and spit 4 times daily as needed  
  
 metroNIDAZOLE 500 mg tablet Commonly known as:  FLAGYL Take 1 Tab by mouth three (3) times daily for 10 days. polyethylene glycol 17 gram/dose powder Commonly known as:  Oralee Donath Take 17 g by mouth daily. predniSONE 10 mg tablet Commonly known as:  DELTASONE  
10 mg.  
  
 * Notice: This list has 2 medication(s) that are the same as other medications prescribed for you.  Read the directions carefully, and ask your doctor or other care provider to review them with you. Prescriptions Sent to Pharmacy Refills  
 amoxicillin (AMOXIL) 500 mg capsule 0 Sig: Take 1 Cap by mouth three (3) times daily for 10 days. Class: Normal  
 Pharmacy: [address] [facility], [address] Ph #: [phone] Route: Oral  
 metroNIDAZOLE (FLAGYL) 500 mg tablet 0 Sig: Take 1 Tab by mouth three (3) times daily for 10 days. Class: Normal  
 Pharmacy: [address] [facility], [address] Ph #: [phone] Route: Oral  
 chlorhexidine (PERIDEX) 0.12 % solution 0 Sig: 15 mL by Swish and Spit route two (2) times a day. Class: Normal  
 Pharmacy: [address], [address] Ph #: [phone] Route: Swish and Spit  
 fluconazole (DIFLUCAN) 150 mg tablet 1 Sig: Take 1 Tab by mouth daily for 1 day. FDA advises cautious prescribing of oral fluconazole in pregnancy. Class: Normal  
 Pharmacy: [address] [facility], [address] Ph #: [phone] Route: Oral  
  
Follow-up Instructions Return in about 3 weeks (around 7/6/2018) for dyspepsia. Introducing [facility] & HEALTH SERVICES! Radha Shipley introduces [employer]89 patient portal. Now you can access parts of your medical record, email your doctor's office, and request medication refills online. 1. In your internet browser, go to [email or web address] 2. Click on the First Time User? Click Here link in the Sign In box. You will see the New Member Sign Up page. 3. Enter your [email or web address] Access Code exactly as it appears below. You will not need to use this code after youve completed the sign-up process. If you do not sign up before the expiration date, you must request a new code. · [email or web address] Access Code: [email or web address] Expires: 9/13/2018  9:59 AM 
 
4.  Enter the last four digits of your Social Security Number (xxxx) and Date of Birth (mm/dd/yyyy) as indicated and click Submit. You will be taken to the next sign-up page. 5. Create a Udemy ID. This will be your Udemy login ID and cannot be changed, so think of one that is secure and easy to remember. 6. Create a Udemy password. You can change your password at any time. 7. Enter your Password Reset Question and Answer. This can be used at a later time if you forget your password. 8. Enter your e-mail address. You will receive e-mail notification when new information is available in [address] 9. Click Sign Up. You can now view and download portions of your medical record. 10. Click the Download Summary menu link to download a portable copy of your medical information. If you have questions, please visit the Frequently Asked Questions section of the Udemy website. Remember, Udemy is NOT to be used for urgent needs. For medical emergencies, dial 911. Now available from your iPhone and Android! Please provide this summary of care documentation to your next provider. Your primary care clinician is listed as [facility]. If you have any questions after today's visit, please call [phone].

## 2018-06-15 NOTE — PROGRESS NOTES
ROOM # 3  Pt reports has been to [facility] [employer] for the same gum problem. Pt was given abx and she said it worked but now its back and she does not have any more of the abx. Pt reports that gums are inflamed and she does not know if this is coming from smoking and drinking. Rashad Reyes presents today for   Chief Complaint   Patient presents with    Gum Problem       Rashad Reyes preferred language for health care discussion is english/other. Is someone accompanying this pt? no    Is the patient using any DME equipment during OV? no    Depression Screening:  PHQ over the last two weeks 6/15/2018 3/5/2018 7/13/2017 3/9/2017 3/9/2017 8/25/2016 8/25/2016   Little interest or pleasure in doing things Not at all Not at all Not at all Not at all Not at all Not at all Not at all   Feeling down, depressed or hopeless Not at all Not at all Not at all Not at all Not at all Not at all Not at all   Total Score PHQ 2 0 0 0 0 0 0 0       Learning Assessment:  Learning Assessment 5/7/2015 5/13/2014   PRIMARY LEARNER Patient Patient   HIGHEST LEVEL OF EDUCATION - PRIMARY LEARNER  DID NOT GRADUATE HIGH SCHOOL DID NOT [facility] CAREGIVER - No   PRIMARY [address]    NEED - No   LEARNER PREFERENCE PRIMARY DEMONSTRATION DEMONSTRATION   LEARNING SPECIAL TOPICS - none   ANSWERED BY patient self   RELATIONSHIP SELF SELF       Abuse Screening:  Abuse Screening Questionnaire 11/28/2017 4/6/2015   Do you ever feel afraid of your partner? N N   Are you in a relationship with someone who physically or mentally threatens you? N N   Is it safe for you to go home? Y Y       Health Maintenance reviewed and discussed per provider.  Yes    Rashad Reyes is due for   Health Maintenance Due   Topic Date Due    COLONOSCOPY  04/30/1981    Pneumococcal 19-64 Medium Risk (1 of 1 - PPSV23) 04/30/1982    PAP AKA CERVICAL CYTOLOGY  04/30/1984    BREAST CANCER SCRN MAMMOGRAM  04/30/2013     Please order/place referral if appropriate. Advance Directive:  1. Do you have an advance directive in place? Patient Reply: no    2. If not, would you like material regarding how to put one in place? Patient Reply: no    Coordination of Care:  1. Have you been to the ER, urgent care clinic since your last visit? Hospitalized since your last visit? no    2. Have you seen or consulted any other health care providers outside of the [address] [place] [address] since your last visit? Include any pap smears or colon screening.  no

## 2018-06-15 NOTE — PROGRESS NOTES
HISTORY OF PRESENT ILLNESS  Brian Hanna is a 52 y.o. female. Visit Vitals    BP (!) 156/96 (BP 1 Location: Right arm, BP Patient Position: Sitting)    Pulse 66    Temp 97.1 °F (36.2 °C) (Oral)    Resp 20    Ht 5' 3\" (1.6 m)    Wt 166 lb 3.2 oz (75.4 kg)    SpO2 97%    BMI 29.44 kg/m2       HPI Comments: Pt had gum problems several months ago and was see at [facility] [place]. She was given metronidazole and felt better for a while. Now she reports recurrent sxs. All of her gums feel swollen. They are also tender. She has tried Alcohol based mouthwash but it stings. They have not been bleeding. Gum Problem    The history is provided by the patient. This is a new problem. The current episode started more than 1 week ago. The problem occurs daily. The problem has been gradually worsening. The pain is located in the generalized mouth. The pain is moderate. Associated symptoms include gum redness. There was no vomiting and no fever. Review of Systems   Constitutional: Negative for chills and fever. Physical Exam   Constitutional: She appears well-developed and well-nourished. No distress. HENT:   Mouth/Throat: Oropharynx is clear and moist.   Gums diffusely have some swelling. Cardiovascular: Normal rate. Skin: She is not diaphoretic. Nursing note and vitals reviewed. ASSESSMENT and PLAN    ICD-10-CM ICD-9-CM    1. Pain in gums K06.8 523.9 amoxicillin (AMOXIL) 500 mg capsule      metroNIDAZOLE (FLAGYL) 500 mg tablet      chlorhexidine (PERIDEX) 0.12 % solution   2. Swelling of gums R22.0 784. 2        Will start back on antibiotics and peridex. If can not get peridex, use mouthwash (non-alcohol)    But she needs to go back to [facility] [place] for an evaluation.     F/u 3-4 weeks for GI sxs

## 2018-06-18 NOTE — TELEPHONE ENCOUNTER
Patient has questions concerning medications prescribed 6-15-18. Patient would like call back from clinical staff.

## 2018-06-19 NOTE — TELEPHONE ENCOUNTER
Incoming call from pt. 2 pt identifiers confirmed. Pt advised that she should be taking amoxicillin and flagyl together TID for 10 days. Pt may take diflucan if she feels she has a yeast infection starting. Pt verbalized understanding of all information. No further questions or concerns at this time.

## 2018-07-06 ENCOUNTER — OFFICE VISIT (OUTPATIENT)
Dept: INTERNAL MEDICINE CLINIC | Age: 53
End: 2018-07-06

## 2018-07-06 VITALS
OXYGEN SATURATION: 95 % | RESPIRATION RATE: 15 BRPM | DIASTOLIC BLOOD PRESSURE: 89 MMHG | TEMPERATURE: 99.1 F | BODY MASS INDEX: 29.59 KG/M2 | WEIGHT: 167 LBS | HEART RATE: 74 BPM | HEIGHT: 63 IN | SYSTOLIC BLOOD PRESSURE: 138 MMHG

## 2018-07-06 DIAGNOSIS — R14.0 ABDOMINAL BLOATING: ICD-10-CM

## 2018-07-06 DIAGNOSIS — Z12.11 SCREEN FOR COLON CANCER: ICD-10-CM

## 2018-07-06 DIAGNOSIS — K59.09 CHRONIC CONSTIPATION: ICD-10-CM

## 2018-07-06 DIAGNOSIS — R10.84 GENERALIZED ABDOMINAL PAIN: Primary | ICD-10-CM

## 2018-07-06 DIAGNOSIS — R14.0 ABDOMINAL DISTENSION: ICD-10-CM

## 2018-07-06 RX ORDER — PHENOL/SODIUM PHENOLATE
20 AEROSOL, SPRAY (ML) MUCOUS MEMBRANE DAILY
Qty: 30 TAB | Refills: 1 | Status: SHIPPED | OUTPATIENT
Start: 2018-07-06 | End: 2021-02-17

## 2018-07-06 RX ORDER — POLYETHYLENE GLYCOL 3350 17 G/17G
17 POWDER, FOR SOLUTION ORAL DAILY
Qty: 510 G | Refills: 5 | Status: SHIPPED | OUTPATIENT
Start: 2018-07-06 | End: 2021-02-17

## 2018-07-06 NOTE — MR AVS SNAPSHOT
[address] [facility] 
 
 
 [address] [address] 
[phone] Patient: Aiden Galvan MRN: [id number] DIM:5/57/[birth year]24 Visit Information Date & Time Provider Department Dept. Phone Encounter #  
 7/6/2018  9:45 AM Mio Larson MD [facility] [phone] [id number] Follow-up Instructions Return in about 2 weeks (around 7/20/2018) for f/u abdominal pain and bloating. Your Appointments 7/6/2018  9:45 AM  
Office Visit with MD LENIN Barajas [facility]) Appt Note: 3 week f/u Dyspepsia  
 [address]  
  
   
 [address] [facility] Upcoming Health Maintenance Date Due COLONOSCOPY 4/30/1981 Pneumococcal 19-64 Medium Risk (1 of 1 - PPSV23) 4/30/1982 PAP AKA CERVICAL CYTOLOGY 4/30/1984 BREAST CANCER SCRN MAMMOGRAM 4/30/2013 DTaP/Tdap/Td series (1 - Tdap) 11/28/2018* Influenza Age 5 to Adult 8/1/2018 *Topic was postponed. The date shown is not the original due date. Allergies as of 7/6/2018  Review Complete On: 7/6/2018 By: Mio Larson MD  
  
 Severity Noted Reaction Type Reactions Hydrocodone-acetaminophen  08/25/2016    Other (comments) Current Immunizations  Reviewed on 5/14/2018 No immunizations on file. Not reviewed this visit You Were Diagnosed With   
  
 Codes Comments Generalized abdominal pain    -  Primary ICD-10-CM: R10.84 ICD-9-CM: 789.07 Abdominal bloating     ICD-10-CM: R14.0 ICD-9-CM: 787.3 Abdominal distension     ICD-10-CM: R14.0 ICD-9-CM: 787.3 Screen for colon cancer     ICD-10-CM: Z12.11 ICD-9-CM: V76.51 Chronic constipation     ICD-10-CM: K59.09 
ICD-9-CM: 564.00 Vitals BP Pulse Temp Resp Height(growth percentile) Weight(growth percentile) 138/89 74 99.1 °F (37.3 °C) (Oral) 15 5' 3\" (1.6 m) 167 lb (75.8 kg) SpO2 BMI OB Status Smoking Status 95% 29.58 kg/m2 Postmenopausal Current Every Day Smoker BMI and BSA Data Body Mass Index Body Surface Area  
 29.58 kg/m 2 1.84 m 2 Preferred Pharmacy Pharmacy Name Phone [address], [address] [phone] Your Updated Medication List  
  
   
This list is accurate as of 7/6/18  8:56 AM.  Always use your most recent med list. amLODIPine 10 mg tablet Commonly known as:  Antonio Presser Take 1 Tab by mouth daily. Indications: hypertension  
  
 furosemide 40 mg tablet Commonly known as:  LASIX Take 1/2 to 1 every day as needed for swelling  Indications: Edema  
  
 ibuprofen 800 mg tablet Commonly known as:  MOTRIN Take 1 Tab by mouth every eight (8) hours as needed for Pain. Omeprazole delayed release 20 mg tablet Commonly known as:  PRILOSEC D/R Take 1 Tab by mouth daily. Indications: dyspepsia  
  
 polyethylene glycol 17 gram/dose powder Commonly known as:  Elva Clos Take 17 g by mouth daily. Prescriptions Sent to Pharmacy Refills  
 polyethylene glycol (MIRALAX) 17 gram/dose powder 5 Sig: Take 17 g by mouth daily. Class: Normal  
 Pharmacy: [facility] DR AYO ANDRES [address], [address] Ph #: [phone] Route: Oral  
 Omeprazole delayed release (PRILOSEC D/R) 20 mg tablet 1 Sig: Take 1 Tab by mouth daily. Indications: dyspepsia Class: Normal  
 Pharmacy: [facility] [address] ANDRES [address], [address] Ph #: [phone] Route: Oral  
  
We Performed the Following REFERRAL TO GASTROENTEROLOGY [GE81 Custom] Follow-up Instructions Return in about 2 weeks (around 7/20/2018) for f/u abdominal pain and bloating. To-Do List   
 07/06/2018 Lab:  AMYLASE   
  
 07/06/2018   Lab:  CBC WITH AUTOMATED DIFF   
  
 07/06/2018 Imaging:  CT ABD PELV W WO CONT   
  
 07/06/2018 Lab:  LIPASE   
  
 07/06/2018 Lab:  METABOLIC PANEL, COMPREHENSIVE   
  
 07/06/2018 Imaging:  XR UPPER GI SERIES W KUB Referral Information Referral ID Referred By Referred To  
  
 [id number] [address], [place] [address], [place]   
   [address] [facility] [address] [facility] Phone: [phone] Fax: [phone] Visits Status Start Date End Date 1 New Request 7/6/18 7/6/19 If your referral has a status of pending review or denied, additional information will be sent to support the outcome of this decision. Introducing [facility]! [employer] introduces [employer]Spring patient portal. Now you can access parts of your medical record, email your doctor's office, and request medication refills online. 1. In your internet browser, go to [email or web address] 2. Click on the First Time User? Click Here link in the Sign In box. You will see the New Member Sign Up page. 3. Enter your BlockSpring Access Code exactly as it appears below. You will not need to use this code after youve completed the sign-up process. If you do not sign up before the expiration date, you must request a new code. · BlockSpring Access Code: [email or web address] Expires: 9/13/2018  9:59 AM 
 
4. Enter the last four digits of your Social Security Number (xxxx) and Date of Birth (mm/dd/yyyy) as indicated and click Submit. You will be taken to the next sign-up page. 5. Create a Schoolwirest ID. This will be your BlockSpring login ID and cannot be changed, so think of one that is secure and easy to remember. 6. Create a BlockSpring password. You can change your password at any time. 7. Enter your Password Reset Question and Answer. This can be used at a later time if you forget your password. 8. Enter your e-mail address.  You will receive e-mail notification when new information is available in [employer] 9. Click Sign Up. You can now view and download portions of your medical record. 10. Click the Download Summary menu link to download a portable copy of your medical information. If you have questions, please visit the Frequently Asked Questions section of the [employer] website. Remember, [employer] is NOT to be used for urgent needs. For medical emergencies, dial 911. Now available from your iPhone and Android! Please provide this summary of care documentation to your next provider. Your primary care clinician is listed as [facility]. If you have any questions after today's visit, please call [phone].

## 2018-07-06 NOTE — PROGRESS NOTES
ROOM # 5    Tye Flores presents today for   Chief Complaint   Patient presents with    Abdominal Pain     pt stated stomach pain, and stomach being upset       Tye Flores preferred language for health care discussion is english/other. Is someone accompanying this pt? no    Is the patient using any DME equipment during OV? no    Depression Screening:  PHQ over the last two weeks 6/15/2018 3/5/2018 7/13/2017 3/9/2017 3/9/2017 8/25/2016 8/25/2016   Little interest or pleasure in doing things Not at all Not at all Not at all Not at all Not at all Not at all Not at all   Feeling down, depressed or hopeless Not at all Not at all Not at all Not at all Not at all Not at all Not at all   Total Score PHQ 2 0 0 0 0 0 0 0       Learning Assessment:  Learning Assessment 5/7/2015 5/13/2014   PRIMARY LEARNER Patient Patient   HIGHEST LEVEL OF EDUCATION - PRIMARY LEARNER  DID NOT GRADUATE HIGH SCHOOL DID NOT [facility] CAREGIVER - No   PRIMARY [address]    NEED - No   LEARNER PREFERENCE PRIMARY DEMONSTRATION DEMONSTRATION   LEARNING SPECIAL TOPICS - none   ANSWERED BY patient self   RELATIONSHIP SELF SELF       Abuse Screening:  Abuse Screening Questionnaire 11/28/2017 4/6/2015   Do you ever feel afraid of your partner? N N   Are you in a relationship with someone who physically or mentally threatens you? N N   Is it safe for you to go home? Y Y       Fall Risk  No flowsheet data found. Health Maintenance reviewed and discussed per provider. Yes    Tye Flores is due for   Health Maintenance Due   Topic Date Due    COLONOSCOPY  04/30/1981    Pneumococcal 19-64 Medium Risk (1 of 1 - PPSV23) 04/30/1982    PAP AKA CERVICAL CYTOLOGY  04/30/1984    BREAST CANCER SCRN MAMMOGRAM  04/30/2013     Pended mammo and colonoscopy  Please order/place referral if appropriate. Advance Directive:  1.  Do you have an advance directive in place? Patient Reply: no    2. If not, would you like material regarding how to put one in place? Patient Reply: no    Coordination of Care:  1. Have you been to the ER, urgent care clinic since your last visit? Hospitalized since your last visit? no    2. Have you seen or consulted any other health care providers outside of the [address] [place] [address] since your last visit? Include any pap smears or colon screening.  no

## 2018-07-06 NOTE — PROGRESS NOTES
HISTORY OF PRESENT ILLNESS  Malia Lund is a 52 y.o. female. Visit Vitals    /89    Pulse 74    Temp 99.1 °F (37.3 °C) (Oral)    Resp 15    Ht 5' 3\" (1.6 m)    Wt 167 lb (75.8 kg)    SpO2 95%    BMI 29.58 kg/m2       HPI Comments: Daily discomfort in her stomach. More like a nausea  Tends to be after eating, almost every meal. Does not seem to matter what she eats. States bloated and gassy. BMs are not regular. Abdominal Pain   The history is provided by the patient. This is a recurrent problem. The current episode started more than 1 week ago. The problem occurs daily. The problem has not changed since onset. Associated symptoms include abdominal pain. Exacerbated by: eating. Treatments tried: [place] yogurt helps a little. Review of Systems   Constitutional: Negative for chills and fever. Gastrointestinal: Positive for abdominal pain and constipation. Negative for blood in stool, diarrhea and vomiting. Physical Exam   Constitutional: She is oriented to person, place, and time. She appears well-developed and well-nourished. No distress. Cardiovascular: Normal rate and regular rhythm. Pulmonary/Chest: Effort normal and breath sounds normal.   Abdominal: Soft. Bowel sounds are normal. She exhibits no distension and no mass. There is no tenderness. There is no rebound and no guarding. Musculoskeletal: She exhibits no edema. Neurological: She is alert and oriented to person, place, and time. Skin: Skin is warm and dry. She is not diaphoretic. Psychiatric: She has a normal mood and affect. Nursing note and vitals reviewed. ASSESSMENT and PLAN    ICD-10-CM ICD-9-CM    1. Generalized abdominal pain P68.93 024.75 METABOLIC PANEL, COMPREHENSIVE      CBC WITH AUTOMATED DIFF      AMYLASE      LIPASE      CT ABD PELV W WO CONT      XR UPPER GI SERIES W KUB      REFERRAL TO GASTROENTEROLOGY      Omeprazole delayed release (PRILOSEC D/R) 20 mg tablet   2.  Abdominal bloating E26.8 015.6 METABOLIC PANEL, COMPREHENSIVE      CBC WITH AUTOMATED DIFF      AMYLASE      LIPASE      CT ABD PELV W WO CONT      XR UPPER GI SERIES W KUB      REFERRAL TO GASTROENTEROLOGY      Omeprazole delayed release (PRILOSEC D/R) 20 mg tablet   3. Abdominal distension L78.2 187.8 METABOLIC PANEL, COMPREHENSIVE      CBC WITH AUTOMATED DIFF      AMYLASE      LIPASE      CT ABD PELV W WO CONT      XR UPPER GI SERIES W KUB      REFERRAL TO GASTROENTEROLOGY   4. Screen for colon cancer Z12.11 V76.51 REFERRAL TO GASTROENTEROLOGY   5. Chronic constipation K59.09 564.00 polyethylene glycol (MIRALAX) 17 gram/dose powder       All of her sxs are new.  She does not have a gall bladder and the sxs she had several years ago resolved when that was done    She has not seen GI so will order lab, tests, and referral to GI.    F/u 2-3 weeks for recheck

## 2018-07-07 LAB
A-G RATIO,AGRAT: 1.7 RATIO (ref 1.1–2.6)
ABSOLUTE LYMPHOCYTE COUNT, 10803: 1.8 K/UL (ref 1–4.8)
ALBUMIN SERPL-MCNC: 4.6 G/DL (ref 3.5–5)
ALP SERPL-CCNC: 106 U/L (ref 25–115)
ALT SERPL-CCNC: 20 U/L (ref 5–40)
AMYLASE SERPL-CCNC: 139 U/L (ref 20–120)
ANION GAP SERPL CALC-SCNC: 17 MMOL/L
AST SERPL W P-5'-P-CCNC: 18 U/L (ref 10–37)
BASOPHILS # BLD: 0 K/UL (ref 0–0.2)
BASOPHILS NFR BLD: 0 % (ref 0–2)
BILIRUB SERPL-MCNC: 0.2 MG/DL (ref 0.2–1.2)
BUN SERPL-MCNC: 28 MG/DL (ref 6–22)
CALCIUM SERPL-MCNC: 9.6 MG/DL (ref 8.4–10.5)
CHLORIDE SERPL-SCNC: 98 MMOL/L (ref 98–110)
CO2 SERPL-SCNC: 24 MMOL/L (ref 20–32)
CREAT SERPL-MCNC: 0.6 MG/DL (ref 0.5–1.2)
EOSINOPHIL # BLD: 0.1 K/UL (ref 0–0.5)
EOSINOPHIL NFR BLD: 1 % (ref 0–6)
ERYTHROCYTE [DISTWIDTH] IN BLOOD BY AUTOMATED COUNT: 14.6 % (ref 10–15.5)
GFRAA, 66117: >60
GFRNA, 66118: >60
GLOBULIN,GLOB: 2.7 G/DL (ref 2–4)
GLUCOSE SERPL-MCNC: 90 MG/DL (ref 70–99)
GRANULOCYTES,GRANS: 56 % (ref 40–75)
HCT VFR BLD AUTO: 40.4 % (ref 35.1–48)
HGB BLD-MCNC: 13.4 G/DL (ref 11.7–16)
LIPASE SERPL-CCNC: 41 U/L (ref 7–60)
LYMPHOCYTES, LYMLT: 37 % (ref 20–45)
MCH RBC QN AUTO: 31 PG (ref 26–34)
MCHC RBC AUTO-ENTMCNC: 33 G/DL (ref 31–36)
MCV RBC AUTO: 94 FL (ref 80–95)
MONOCYTES # BLD: 0.3 K/UL (ref 0.1–1)
MONOCYTES NFR BLD: 5 % (ref 3–12)
NEUTROPHILS # BLD AUTO: 2.7 K/UL (ref 1.8–7.7)
PLATELET # BLD AUTO: 330 K/UL (ref 140–440)
PMV BLD AUTO: 10.2 FL (ref 9–13)
POTASSIUM SERPL-SCNC: 4.4 MMOL/L (ref 3.5–5.5)
PROT SERPL-MCNC: 7.3 G/DL (ref 6.4–8.3)
RBC # BLD AUTO: 4.31 M/UL (ref 3.8–5.2)
SODIUM SERPL-SCNC: 139 MMOL/L (ref 133–145)
WBC # BLD AUTO: 4.9 K/UL (ref 4–11)

## 2018-09-29 DIAGNOSIS — Z76.0 MEDICATION REFILL: ICD-10-CM

## 2018-09-30 DIAGNOSIS — Z76.0 MEDICATION REFILL: ICD-10-CM

## 2018-09-30 DIAGNOSIS — I10 ESSENTIAL HYPERTENSION: Primary | Chronic | ICD-10-CM

## 2018-09-30 RX ORDER — AMLODIPINE BESYLATE 10 MG/1
TABLET ORAL
Qty: 30 TAB | Refills: 5 | Status: SHIPPED | OUTPATIENT
Start: 2018-09-30 | End: 2019-04-10 | Stop reason: SDUPTHER

## 2018-09-30 RX ORDER — AMLODIPINE BESYLATE 10 MG/1
10 TABLET ORAL DAILY
Qty: 30 TAB | Refills: 5 | Status: SHIPPED | OUTPATIENT
Start: 2018-09-30 | End: 2018-09-30 | Stop reason: SDUPTHER

## 2018-11-28 DIAGNOSIS — Z76.0 MEDICATION REFILL: ICD-10-CM

## 2018-11-28 DIAGNOSIS — R60.9 EDEMA, UNSPECIFIED TYPE: ICD-10-CM

## 2018-11-28 RX ORDER — FUROSEMIDE 40 MG/1
TABLET ORAL
Qty: 30 TAB | Refills: 5 | Status: SHIPPED | OUTPATIENT
Start: 2018-11-28 | End: 2019-04-10 | Stop reason: SDUPTHER

## 2019-04-10 DIAGNOSIS — R60.9 EDEMA, UNSPECIFIED TYPE: ICD-10-CM

## 2019-04-10 DIAGNOSIS — Z76.0 MEDICATION REFILL: ICD-10-CM

## 2019-04-10 RX ORDER — FUROSEMIDE 40 MG/1
TABLET ORAL
Qty: 30 TAB | Refills: 5 | Status: SHIPPED | OUTPATIENT
Start: 2019-04-10 | End: 2022-04-13 | Stop reason: SDUPTHER

## 2019-04-10 RX ORDER — AMLODIPINE BESYLATE 10 MG/1
10 TABLET ORAL DAILY
Qty: 30 TAB | Refills: 5 | Status: SHIPPED | OUTPATIENT
Start: 2019-04-10 | End: 2019-10-22 | Stop reason: SDUPTHER

## 2019-04-10 NOTE — TELEPHONE ENCOUNTER
Requested Prescriptions     Pending Prescriptions Disp Refills    amLODIPine (NORVASC) 10 mg tablet 30 Tab 5    furosemide (LASIX) 40 mg tablet 30 Tab 5     Sig: TAKE ONE-HALF TO ONE TABLET BY MOUTH ONCE DAILY AS NEEDED FOR SWELLING

## 2019-06-05 DIAGNOSIS — K08.89 PAIN IN A TOOTH OR TEETH: ICD-10-CM

## 2019-06-05 DIAGNOSIS — M54.5 RIGHT LOW BACK PAIN, UNSPECIFIED CHRONICITY, WITH SCIATICA PRESENCE UNSPECIFIED: ICD-10-CM

## 2019-06-07 DIAGNOSIS — M54.5 RIGHT LOW BACK PAIN, UNSPECIFIED CHRONICITY, WITH SCIATICA PRESENCE UNSPECIFIED: ICD-10-CM

## 2019-06-07 DIAGNOSIS — K08.89 PAIN IN A TOOTH OR TEETH: ICD-10-CM

## 2019-06-07 RX ORDER — IBUPROFEN 800 MG/1
800 TABLET ORAL
Qty: 90 TAB | Refills: 5 | Status: SHIPPED | OUTPATIENT
Start: 2019-06-07 | End: 2021-02-17 | Stop reason: SDUPTHER

## 2019-06-11 RX ORDER — IBUPROFEN 800 MG/1
TABLET ORAL
Qty: 90 TAB | Refills: 5 | Status: SHIPPED | OUTPATIENT
Start: 2019-06-11 | End: 2020-03-31 | Stop reason: SDUPTHER

## 2019-06-13 ENCOUNTER — OFFICE VISIT (OUTPATIENT)
Dept: INTERNAL MEDICINE CLINIC | Age: 54
End: 2019-06-13

## 2019-06-13 VITALS
RESPIRATION RATE: 20 BRPM | WEIGHT: 182 LBS | TEMPERATURE: 97.1 F | SYSTOLIC BLOOD PRESSURE: 161 MMHG | BODY MASS INDEX: 32.25 KG/M2 | HEART RATE: 67 BPM | HEIGHT: 63 IN | OXYGEN SATURATION: 99 % | DIASTOLIC BLOOD PRESSURE: 90 MMHG

## 2019-06-13 DIAGNOSIS — R63.5 WEIGHT GAIN: ICD-10-CM

## 2019-06-13 DIAGNOSIS — R07.89 RIGHT-SIDED CHEST WALL PAIN: ICD-10-CM

## 2019-06-13 DIAGNOSIS — M79.641 PAIN IN BOTH HANDS: ICD-10-CM

## 2019-06-13 DIAGNOSIS — M25.561 CHRONIC PAIN OF BOTH KNEES: ICD-10-CM

## 2019-06-13 DIAGNOSIS — M25.50 ARTHRALGIA, UNSPECIFIED JOINT: Primary | ICD-10-CM

## 2019-06-13 DIAGNOSIS — M25.50 ARTHRALGIA, UNSPECIFIED JOINT: ICD-10-CM

## 2019-06-13 DIAGNOSIS — M79.642 PAIN IN BOTH HANDS: ICD-10-CM

## 2019-06-13 DIAGNOSIS — M25.562 CHRONIC PAIN OF BOTH KNEES: ICD-10-CM

## 2019-06-13 DIAGNOSIS — G89.29 CHRONIC PAIN OF BOTH KNEES: ICD-10-CM

## 2019-06-13 RX ORDER — METHYLPREDNISOLONE 4 MG/1
TABLET ORAL
Qty: 1 DOSE PACK | Refills: 0 | Status: SHIPPED | OUTPATIENT
Start: 2019-06-13 | End: 2021-02-17

## 2019-06-13 NOTE — PROGRESS NOTES
ROOM # 6    Whit Vila presents today for   Chief Complaint   Patient presents with    Generalized Body Aches    Fatigue       Whit Vila preferred language for health care discussion is english/other. Is someone accompanying this pt? no    Is the patient using any DME equipment during OV? no    Depression Screening:  3 most recent [facility] Screens 6/15/2018 3/5/2018 7/13/2017 3/9/2017 3/9/2017 8/25/2016 8/25/2016   Little interest or pleasure in doing things Not at all Not at all Not at all Not at all Not at all Not at all Not at all   Feeling down, depressed, irritable, or hopeless Not at all Not at all Not at all Not at all Not at all Not at all Not at all   Total Score PHQ 2 0 0 0 0 0 0 0       Learning Assessment:  Learning Assessment 5/7/2015 5/13/2014   PRIMARY LEARNER Patient Patient   HIGHEST LEVEL OF EDUCATION - PRIMARY LEARNER  DID NOT GRADUATE HIGH SCHOOL DID NOT [facility] CAREGIVER - No   PRIMARY [address]    NEED - No   LEARNER PREFERENCE PRIMARY DEMONSTRATION [address] - none   ANSWERED BY patient self   RELATIONSHIP SELF SELF       Abuse Screening:  Abuse Screening Questionnaire 11/28/2017 4/6/2015   Do you ever feel afraid of your partner? N N   Are you in a relationship with someone who physically or mentally threatens you? N N   Is it safe for you to go home? Y Y       Fall Risk  No flowsheet data found. Health Maintenance reviewed and discussed per provider.  Yes    Whit Vila is due for   Health Maintenance Due   Topic Date Due    Pneumococcal 0-64 years (1 of 1 - PPSV23) 04/30/1969    COLONOSCOPY  04/30/1981    DTaP/Tdap/Td series (1 - Tdap) 04/30/1984    PAP AKA CERVICAL CYTOLOGY  04/30/1984    Shingrix Vaccine Age 50> (1 of 2) 04/30/2013    BREAST CANCER SCRN MAMMOGRAM  04/30/2013    MEDICARE YEARLY EXAM  10/16/2018     Please order/place referral if appropriate. Advance Directive:  1. Do you have an advance directive in place? Patient Reply: no    2. If not, would you like material regarding how to put one in place? Patient Reply: no    Coordination of Care:  1. Have you been to the ER, urgent care clinic since your last visit? Hospitalized since your last visit? no    2. Have you seen or consulted any other health care providers outside of the [address] [place] [address] since your last visit? Include any pap smears or colon screening.  no

## 2019-06-13 NOTE — PROGRESS NOTES
HISTORY OF PRESENT ILLNESS  Shelly Guerra is a 62 y.o. female. Visit Vitals  /90 (BP 1 Location: Left arm, BP Patient Position: Sitting)   Pulse 67   Temp 97.1 °F (36.2 °C) (Oral)   Resp 20   Ht 5' 3\" (1.6 m)   Wt 182 lb (82.6 kg)   SpO2 99%   BMI 32.24 kg/m²       \"my bones ache a lot. \"  \"I just ache all over. \"    Her joints hurt. They feel stiff. Her knees ache. Ibuprofen two 800 mg ibuprofen help some but the pain does not stay away. AC seems to make it a little worse    We did some rheumatology testing in 2017 which was negative  She has not had any xrays of hands or knees      Review of Systems   Constitutional: Negative for chills and fever. HENT: Negative. Eyes: Negative. Respiratory: Negative for shortness of breath. Cardiovascular: Negative for chest pain. Musculoskeletal: Positive for joint pain and myalgias. Skin: Negative for rash. Neurological: Negative for sensory change and focal weakness. Physical Exam   Constitutional: She is oriented to person, place, and time. She appears well-developed and well-nourished. No distress. Cardiovascular: Normal rate and regular rhythm. Pulmonary/Chest: Effort normal and breath sounds normal.   Musculoskeletal: She exhibits no edema. Neurological: She is alert and oriented to person, place, and time. Skin: Skin is warm and dry. She is not diaphoretic. Psychiatric: She has a normal mood and affect. Nursing note and vitals reviewed. ASSESSMENT and PLAN    ICD-10-CM ICD-9-CM    1. Arthralgia, unspecified joint H05.41 541.56 METABOLIC PANEL, COMPREHENSIVE      C REACTIVE PROTEIN, QT      CBC WITH AUTOMATED DIFF      CYCLIC CITRUL PEPTIDE AB, IGG      ANTINUCLEAR ANTIBODIES, IFA      VITAMIN D, 25 HYDROXY      URIC ACID   2. Weight gain R63.5 783.1 TSH AND FREE T4   3. Chronic pain of both knees M25.561 719.46 XR KNEE LT 3 V    M25.562 338.29 XR KNEE RT 3 V    G89.29     4.  Pain in both hands M79.641 729.5 XR HAND LT MIN 3 V    M79.642  XR HAND RT MIN 3 V   5. Right-sided chest wall pain R07.89 786.52 XR CHEST PA LAT         Will recheck rheum screen. Also vitamin D level  Will xray pertinent joints    F/u 2 weeks for recheck. Pt to take an MVI for now.

## 2019-07-23 ENCOUNTER — OFFICE VISIT (OUTPATIENT)
Dept: INTERNAL MEDICINE CLINIC | Age: 54
End: 2019-07-23

## 2019-07-23 VITALS
SYSTOLIC BLOOD PRESSURE: 152 MMHG | OXYGEN SATURATION: 98 % | DIASTOLIC BLOOD PRESSURE: 89 MMHG | WEIGHT: 180 LBS | HEART RATE: 68 BPM | RESPIRATION RATE: 20 BRPM | TEMPERATURE: 97.3 F | HEIGHT: 63 IN | BODY MASS INDEX: 31.89 KG/M2

## 2019-07-23 DIAGNOSIS — R63.5 WEIGHT GAIN: ICD-10-CM

## 2019-07-23 DIAGNOSIS — M54.5 RIGHT LOW BACK PAIN, UNSPECIFIED CHRONICITY, WITH SCIATICA PRESENCE UNSPECIFIED: ICD-10-CM

## 2019-07-23 DIAGNOSIS — M25.50 ARTHRALGIA, UNSPECIFIED JOINT: Primary | ICD-10-CM

## 2019-07-23 NOTE — PROGRESS NOTES
ROOM # 6    Cristhian Marx presents today for   Chief Complaint   Patient presents with   [address] preferred language for health care discussion is english/other. Is someone accompanying this pt? no    Is the patient using any DME equipment during OV? no    Depression Screening:  3 most recent [facility] Screens 6/15/2018 3/5/2018 7/13/2017 3/9/2017 3/9/2017 8/25/2016 8/25/2016   Little interest or pleasure in doing things Not at all Not at all Not at all Not at all Not at all Not at all Not at all   Feeling down, depressed, irritable, or hopeless Not at all Not at all Not at all Not at all Not at all Not at all Not at all   Total Score PHQ 2 0 0 0 0 0 0 0       Learning Assessment:  Learning Assessment 5/7/2015 5/13/2014   PRIMARY LEARNER Patient Patient   HIGHEST LEVEL OF EDUCATION - PRIMARY LEARNER  DID NOT GRADUATE HIGH SCHOOL DID NOT [facility] CAREGIVER - No   PRIMARY [address]    NEED - No   LEARNER PREFERENCE PRIMARY DEMONSTRATION [address] - none   ANSWERED BY patient self   RELATIONSHIP SELF SELF       Abuse Screening:  Abuse Screening Questionnaire 11/28/2017 4/6/2015   Do you ever feel afraid of your partner? N N   Are you in a relationship with someone who physically or mentally threatens you? N N   Is it safe for you to go home? Y Y       Fall Risk  No flowsheet data found. Health Maintenance reviewed and discussed per provider.  Yes    Cristhian Marx is due for   Health Maintenance Due   Topic Date Due    Pneumococcal 0-64 years (1 of 1 - PPSV23) 04/30/1969    COLONOSCOPY  04/30/1981    DTaP/Tdap/Td series (1 - Tdap) 04/30/1984    PAP AKA CERVICAL CYTOLOGY  04/30/1984    Shingrix Vaccine Age 50> (1 of 2) 04/30/2013    BREAST CANCER SCRN MAMMOGRAM  04/30/2013    MEDICARE YEARLY EXAM  10/16/2018     Please order/place referral if appropriate. Advance Directive:  1. Do you have an advance directive in place? Patient Reply: no    2. If not, would you like material regarding how to put one in place? Patient Reply: no    Coordination of Care:  1. Have you been to the ER, urgent care clinic since your last visit? Hospitalized since your last visit? no    2. Have you seen or consulted any other health care providers outside of the [address] [place] [address] since your last visit? Include any pap smears or colon screening.  no

## 2019-07-23 NOTE — PROGRESS NOTES
HISTORY OF PRESENT ILLNESS  Waldemar Ken is a 62 y.o. female. Visit Vitals  /89 (BP 1 Location: Left arm, BP Patient Position: Sitting)   Pulse 68   Temp 97.3 °F (36.3 °C) (Oral)   Resp 20   Ht 5' 3\" (1.6 m)   Wt 180 lb (81.6 kg)   SpO2 98%   BMI 31.89 kg/m²       Here to f/u on xrays and tests for joint pain  We tried medrol dose pack which helped some    She did not get the lab    Joint Pain    The history is provided by the patient. This is a chronic problem. The current episode started more than 1 week ago. The problem occurs daily. The problem has been gradually improving. Pain location: multiple areas. Associated symptoms include stiffness. Treatments tried: NSAIDs. The treatment provided mild relief. Review of Systems   Musculoskeletal: Positive for joint pain and stiffness. Physical Exam   Constitutional: She is oriented to person, place, and time. She appears well-developed and well-nourished. No distress. Cardiovascular: Normal rate. Pulmonary/Chest: Effort normal.   Neurological: She is alert and oriented to person, place, and time. Skin: Skin is warm and dry. She is not diaphoretic. Psychiatric: She has a normal mood and affect. Nursing note and vitals reviewed. ASSESSMENT and PLAN    ICD-10-CM ICD-9-CM    1. Arthralgia, unspecified joint M25.50 719.40    2. Right low back pain, unspecified chronicity, with sciatica presence unspecified M54.5 724.2    3. Weight gain R63.5 783.1        xrays were unremarkable. Lab needs to be drawn    BP up today  Work on stress    Discussed BMI/weight, lifestyle, diet and exercise. Discussed effect on blood pressure, blood sugar, and joints especially  Focus on limiting white carbs, portion control, and moving more.   Will continue to work on this    F/u one month

## 2019-07-26 LAB
25(OH)D3 SERPL-MCNC: 34.1 NG/ML (ref 32–100)
A-G RATIO,AGRAT: 1.6 RATIO (ref 1.1–2.6)
ABSOLUTE LYMPHOCYTE COUNT, 10803: 1.9 K/UL (ref 1–4.8)
ALBUMIN SERPL-MCNC: 4.9 G/DL (ref 3.5–5)
ALP SERPL-CCNC: 132 U/L (ref 25–115)
ALT SERPL-CCNC: 21 U/L (ref 5–40)
ANA HOMOGENEOUS, 8012: NEGATIVE TITER
ANA NUCLEOLAR, 8013: NEGATIVE TITER
ANA PERIPHERAL, 8014: NEGATIVE TITER
ANA SPECKLED, 8011: NEGATIVE TITER
ANION GAP SERPL CALC-SCNC: 21 MMOL/L
AST SERPL W P-5'-P-CCNC: 23 U/L (ref 10–37)
BASOPHILS # BLD: 0 K/UL (ref 0–0.2)
BASOPHILS NFR BLD: 0 % (ref 0–2)
BILIRUB SERPL-MCNC: 0.3 MG/DL (ref 0.2–1.2)
BUN SERPL-MCNC: 19 MG/DL (ref 6–22)
C-REACTIVE PROTEIN, QT, 006627: 0.3 MG/DL (ref 0–0.5)
CALCIUM SERPL-MCNC: 10.2 MG/DL (ref 8.4–10.5)
CCP ANTIBODY IGG,99138601: <0.5 U/ML
CENTROMERE ANTIBODIES, 8254: NEGATIVE TITER
CHLORIDE SERPL-SCNC: 99 MMOL/L (ref 98–110)
CO2 SERPL-SCNC: 21 MMOL/L (ref 20–32)
CREAT SERPL-MCNC: 0.6 MG/DL (ref 0.5–1.2)
EOSINOPHIL # BLD: 0 K/UL (ref 0–0.5)
EOSINOPHIL NFR BLD: 0 % (ref 0–6)
ERYTHROCYTE [DISTWIDTH] IN BLOOD BY AUTOMATED COUNT: 14.4 % (ref 10–15.5)
GFRAA, 66117: >60
GFRNA, 66118: >60
GLOBULIN,GLOB: 3.1 G/DL (ref 2–4)
GLUCOSE SERPL-MCNC: 84 MG/DL (ref 70–99)
GRANULOCYTES,GRANS: 62 % (ref 40–75)
HCT VFR BLD AUTO: 44.3 % (ref 35.1–48)
HGB BLD-MCNC: 14.3 G/DL (ref 11.7–16)
IMMATURE PLATELET FRACTION: 2.5 % (ref 1.1–7.1)
LYMPHOCYTES, LYMLT: 33 % (ref 20–45)
MCH RBC QN AUTO: 31 PG (ref 26–34)
MCHC RBC AUTO-ENTMCNC: 32 G/DL (ref 31–36)
MCV RBC AUTO: 96 FL (ref 80–95)
MONOCYTES # BLD: 0.2 K/UL (ref 0.1–1)
MONOCYTES NFR BLD: 4 % (ref 3–12)
NEUTROPHILS # BLD AUTO: 3.6 K/UL (ref 1.8–7.7)
PLATELET # BLD AUTO: 308 K/UL (ref 140–440)
PMV BLD AUTO: 10.6 FL (ref 9–13)
POTASSIUM SERPL-SCNC: 4.1 MMOL/L (ref 3.5–5.5)
PROT SERPL-MCNC: 8 G/DL (ref 6.4–8.3)
RBC # BLD AUTO: 4.64 M/UL (ref 3.8–5.2)
SODIUM SERPL-SCNC: 141 MMOL/L (ref 133–145)
T4 FREE SERPL-MCNC: 1.3 NG/DL (ref 0.9–1.8)
TSH SERPL DL<=0.005 MIU/L-ACNC: 0.61 MCU/ML (ref 0.27–4.2)
URATE SERPL-MCNC: 4.3 MG/DL (ref 2.2–7.7)
WBC # BLD AUTO: 5.8 K/UL (ref 4–11)

## 2019-09-25 PROBLEM — Z00.00 ANNUAL PHYSICAL EXAM: Status: RESOLVED | Noted: 2017-05-01 | Resolved: 2019-09-25

## 2019-10-22 DIAGNOSIS — Z76.0 MEDICATION REFILL: ICD-10-CM

## 2019-10-22 RX ORDER — AMLODIPINE BESYLATE 10 MG/1
10 TABLET ORAL DAILY
Qty: 30 TAB | Refills: 5 | Status: SHIPPED | OUTPATIENT
Start: 2019-10-22 | End: 2020-03-31 | Stop reason: SDUPTHER

## 2019-10-22 NOTE — TELEPHONE ENCOUNTER
Patient request for refill. Last OV 7/23/19, next scheduled 10/28/19. Requested Prescriptions     Pending Prescriptions Disp Refills    amLODIPine (NORVASC) 10 mg tablet 30 Tab 5     Sig: Take 1 Tab by mouth daily.

## 2020-03-31 DIAGNOSIS — K08.89 PAIN IN A TOOTH OR TEETH: ICD-10-CM

## 2020-03-31 DIAGNOSIS — M54.50 RIGHT LOW BACK PAIN: ICD-10-CM

## 2020-03-31 DIAGNOSIS — Z76.0 MEDICATION REFILL: ICD-10-CM

## 2020-03-31 RX ORDER — IBUPROFEN 800 MG/1
TABLET ORAL
Qty: 90 TAB | Refills: 5 | Status: SHIPPED | OUTPATIENT
Start: 2020-03-31 | End: 2021-02-17

## 2020-03-31 RX ORDER — AMLODIPINE BESYLATE 10 MG/1
10 TABLET ORAL DAILY
Qty: 30 TAB | Refills: 5 | Status: SHIPPED | OUTPATIENT
Start: 2020-03-31 | End: 2020-11-30

## 2020-05-11 ENCOUNTER — VIRTUAL VISIT (OUTPATIENT)
Dept: INTERNAL MEDICINE CLINIC | Age: 55
End: 2020-05-11

## 2021-02-17 ENCOUNTER — OFFICE VISIT (OUTPATIENT)
Dept: INTERNAL MEDICINE CLINIC | Age: 56
End: 2021-02-17
Payer: MEDICAID

## 2021-02-17 VITALS
RESPIRATION RATE: 14 BRPM | BODY MASS INDEX: 33.63 KG/M2 | WEIGHT: 189.8 LBS | HEIGHT: 63 IN | DIASTOLIC BLOOD PRESSURE: 77 MMHG | OXYGEN SATURATION: 96 % | SYSTOLIC BLOOD PRESSURE: 123 MMHG | HEART RATE: 73 BPM | TEMPERATURE: 97.9 F

## 2021-02-17 DIAGNOSIS — G89.29 CHRONIC PAIN OF BOTH KNEES: ICD-10-CM

## 2021-02-17 DIAGNOSIS — Z12.31 SCREENING MAMMOGRAM FOR HIGH-RISK PATIENT: ICD-10-CM

## 2021-02-17 DIAGNOSIS — M25.562 CHRONIC PAIN OF BOTH KNEES: ICD-10-CM

## 2021-02-17 DIAGNOSIS — M25.561 CHRONIC PAIN OF BOTH KNEES: ICD-10-CM

## 2021-02-17 DIAGNOSIS — Z76.0 MEDICATION REFILL: ICD-10-CM

## 2021-02-17 DIAGNOSIS — F17.200 TOBACCO USE DISORDER: ICD-10-CM

## 2021-02-17 DIAGNOSIS — L29.9 SCALP ITCH: ICD-10-CM

## 2021-02-17 DIAGNOSIS — R63.5 WEIGHT GAIN: ICD-10-CM

## 2021-02-17 DIAGNOSIS — R53.83 FATIGUE, UNSPECIFIED TYPE: ICD-10-CM

## 2021-02-17 DIAGNOSIS — Z12.11 SCREEN FOR COLON CANCER: ICD-10-CM

## 2021-02-17 DIAGNOSIS — M25.511 CHRONIC RIGHT SHOULDER PAIN: ICD-10-CM

## 2021-02-17 DIAGNOSIS — G89.29 CHRONIC RIGHT SHOULDER PAIN: ICD-10-CM

## 2021-02-17 DIAGNOSIS — M25.50 ARTHRALGIA, UNSPECIFIED JOINT: ICD-10-CM

## 2021-02-17 DIAGNOSIS — I10 ESSENTIAL HYPERTENSION: Primary | Chronic | ICD-10-CM

## 2021-02-17 PROCEDURE — 99214 OFFICE O/P EST MOD 30 MIN: CPT | Performed by: INTERNAL MEDICINE

## 2021-02-17 RX ORDER — KETOCONAZOLE 20 MG/ML
SHAMPOO TOPICAL
Qty: 120 ML | Refills: 1 | Status: SHIPPED | OUTPATIENT
Start: 2021-02-17 | End: 2021-12-09

## 2021-02-17 RX ORDER — AMLODIPINE BESYLATE 10 MG/1
TABLET ORAL
Qty: 30 TAB | Refills: 5 | Status: SHIPPED | OUTPATIENT
Start: 2021-02-17 | End: 2021-10-20

## 2021-02-17 RX ORDER — IBUPROFEN 800 MG/1
800 TABLET ORAL
Qty: 90 TAB | Refills: 5 | Status: SHIPPED | OUTPATIENT
Start: 2021-02-17 | End: 2022-04-13

## 2021-02-17 NOTE — PROGRESS NOTES
HISTORY OF PRESENT ILLNESS  Kush Livingston is a 60 y.o. female. /77 (BP 1 Location: Right arm, BP Patient Position: Sitting)   Pulse 73   Temp 97.9 °F (36.6 °C) (Temporal)   Resp 14   Ht 5' 3\" (1.6 m)   Wt 189 lb 12.8 oz (86.1 kg)   SpO2 96%   BMI 33.62 kg/m²     C/o diffuse achiness and bone pain      Lost her son on Thanksgiving day. He was 30    Hair/Scalp Problem  The history is provided by the patient (scalp \"burns\" a lot. No rashes. But some hair loss). This is a chronic problem. The current episode started more than 1 week ago. The problem occurs daily. Hypertension   The history is provided by the patient. This is a chronic problem. The current episode started more than 1 week ago. The problem has not changed since onset. Pertinent negatives include no palpitations. Review of Systems   Constitutional: Negative for chills and fever. HENT: Negative for congestion. Respiratory: Negative for cough. Cardiovascular: Negative for palpitations. Genitourinary: Positive for frequency. Negative for urgency. Musculoskeletal: Positive for joint pain and myalgias. Endo/Heme/Allergies: Negative for polydipsia. Physical Exam  Vitals signs and nursing note reviewed. Constitutional:       Appearance: Normal appearance. She is well-developed. Cardiovascular:      Rate and Rhythm: Normal rate and regular rhythm. Pulmonary:      Effort: Pulmonary effort is normal.      Breath sounds: Normal breath sounds. Musculoskeletal:      Right lower leg: No edema. Left lower leg: No edema. Comments: Right shoulder pain and crepitus. Some decreased ROM. Good hand . No hand findings to suggest RA. No knee or ankle findings. Skin:     General: Skin is warm and dry. Comments: Scalp appears clear. Neurological:      General: No focal deficit present. Mental Status: She is alert and oriented to person, place, and time.    Psychiatric:         Mood and Affect: Mood normal.         Behavior: Behavior normal.         ASSESSMENT and PLAN    ICD-10-CM ICD-9-CM    1. Essential hypertension  N19 368.2 METABOLIC PANEL, COMPREHENSIVE      URINALYSIS W/ RFLX MICROSCOPIC      URINALYSIS W/ RFLX MICROSCOPIC      METABOLIC PANEL, COMPREHENSIVE   2. Chronic pain of both knees  M25.561 719.46 CBC WITH AUTOMATED DIFF    M25.562 338.29 CBC WITH AUTOMATED DIFF    G89.29     3. Weight gain  R63.5 783.1 TSH AND FREE T4      TSH AND FREE T4   4. Arthralgia, unspecified joint  M25.50 719.40 VITAMIN D, 25 HYDROXY      SHAHBAZ COMPREHENSIVE PANEL      RHEUMATOID FACTOR, QT      RHEUMATOID FACTOR, QT      SHAHBAZ COMPREHENSIVE PANEL      VITAMIN D, 25 HYDROXY   5. Fatigue, unspecified type  U83.58 379.93 METABOLIC PANEL, COMPREHENSIVE      CBC WITH AUTOMATED DIFF      TSH AND FREE T4      VITAMIN D, 25 HYDROXY      VITAMIN B12 & FOLATE      VITAMIN B12 & FOLATE      VITAMIN D, 25 HYDROXY      TSH AND FREE T4      CBC WITH AUTOMATED DIFF      METABOLIC PANEL, COMPREHENSIVE   6. Scalp itch  L29.9 698.9 ketoconazole (NIZORAL) 2 % shampoo   7. Chronic right shoulder pain  M25.511 719.41 XR SHOULDER RIGHT 3 VIEW    G89.29 338.29    8. Tobacco use disorder  F17.200 305.1    9. Screen for colon cancer  Z12.11 V76.51 OCCULT BLOOD IMMUNOASSAY,DIAGNOSTIC      OCCULT BLOOD IMMUNOASSAY,DIAGNOSTIC   10. Screening mammogram for high-risk patient  Z12.31 V76.11 MASSIEL MAMMO BI SCREENING INCL CAD      MASSIEL MAMMO BI SCREENING INCL CAD   6. Medication refill  Z76.0 V68.1 amLODIPine (NORVASC) 10 mg tablet      ibuprofen (MOTRIN) 800 mg tablet       BP controlled    Update lab    Declined COVID vaccine.     F/u 3 months for [employer], recheck HTN

## 2021-02-18 LAB
25(OH)D3 SERPL-MCNC: 42.5 NG/ML (ref 32–100)
A-G RATIO,AGRAT: 1.6 RATIO (ref 1.1–2.6)
ALBUMIN SERPL-MCNC: 4.5 G/DL (ref 3.5–5)
ALP SERPL-CCNC: 161 U/L (ref 25–115)
ALT SERPL-CCNC: 25 U/L (ref 5–40)
ANION GAP SERPL CALC-SCNC: 13 MMOL/L (ref 3–15)
ANTI-DNA (DS) AB QN, 1189: <1 IU/ML
AST SERPL W P-5'-P-CCNC: 21 U/L (ref 10–37)
BACTERIA,BACTU: PRESENT
BILIRUB SERPL-MCNC: 0.2 MG/DL (ref 0.2–1.2)
BILIRUB UR QL: NEGATIVE
BUN SERPL-MCNC: 17 MG/DL (ref 6–22)
CALCIUM SERPL-MCNC: 10.5 MG/DL (ref 8.4–10.5)
CENTROMERE B ANTIBODY, 601143: <0.2 AI
CHLORIDE SERPL-SCNC: 103 MMOL/L (ref 98–110)
CHROMATIN ANTIBODY: <0.2 AI
CLARITY: CLEAR
CO2 SERPL-SCNC: 27 MMOL/L (ref 20–32)
COLOR UR: YELLOW
CREAT SERPL-MCNC: 0.8 MG/DL (ref 0.5–1.2)
ENA SS-A AB SER-ACNC: <0.2 AI
ENA SS-B AB SER-ACNC: <0.2 AI
EPITHELIAL,EPSU: ABNORMAL /HPF (ref 0–2)
ERYTHROCYTE [DISTWIDTH] IN BLOOD BY AUTOMATED COUNT: 14.1 % (ref 10–15.5)
FOLATE,FOL: 19.6 NG/ML
GFRAA, 66117: >60
GFRNA, 66118: >60
GLOBULIN,GLOB: 2.8 G/DL (ref 2–4)
GLUCOSE SERPL-MCNC: 105 MG/DL (ref 70–99)
GLUCOSE UR QL: NEGATIVE MG/DL
HCT VFR BLD AUTO: 42.7 % (ref 35.1–48)
HGB BLD-MCNC: 13.5 G/DL (ref 11.7–16)
HGB UR QL STRIP: NEGATIVE
JO1 ANTIBODY, 8107: <0.2 AI
KETONES UR QL STRIP.AUTO: NEGATIVE MG/DL
LEUKOCYTE ESTERASE: NEGATIVE
MCH RBC QN AUTO: 30 PG (ref 26–34)
MCHC RBC AUTO-ENTMCNC: 32 G/DL (ref 31–36)
MCV RBC AUTO: 96 FL (ref 81–99)
NITRITE UR QL STRIP.AUTO: NEGATIVE
PH UR STRIP: 6 PH (ref 5–8)
PLATELET # BLD AUTO: 373 K/UL (ref 140–440)
PMV BLD AUTO: 10.2 FL (ref 9–13)
POTASSIUM SERPL-SCNC: 4 MMOL/L (ref 3.5–5.5)
PROT SERPL-MCNC: 7.3 G/DL (ref 6.4–8.3)
PROT UR QL STRIP: NEGATIVE MG/DL
RBC # BLD AUTO: 4.47 M/UL (ref 3.8–5.2)
RBC #/AREA URNS HPF: ABNORMAL /HPF
RHEUMATOID FACTOR QUANT, IMMUNOTURBIDIMETRIC: <20 IU/ML (ref 0–20)
RNP ABS, 016354: <0.2 AI
SCLERODERMA AB (SCL-70), 601116: <0.2 AI
SMITH ABS, 016362: <0.2 AI
SODIUM SERPL-SCNC: 143 MMOL/L (ref 133–145)
SP GR UR: 1.02 (ref 1–1.03)
T4 FREE SERPL-MCNC: 1.4 NG/DL (ref 0.9–1.8)
TSH SERPL DL<=0.005 MIU/L-ACNC: 0.68 MCU/ML (ref 0.27–4.2)
URINE ASCORBIC ACID: ABNORMAL MG/DL
UROBILINOGEN UR STRIP-MCNC: <2 MG/DL
VIT B12 SERPL-MCNC: 1210 PG/ML (ref 211–911)
WBC # BLD AUTO: 7.8 K/UL (ref 4–11)
WBC URNS QL MICRO: ABNORMAL /HPF (ref 0–2)

## 2021-10-20 DIAGNOSIS — Z76.0 MEDICATION REFILL: ICD-10-CM

## 2021-10-20 RX ORDER — AMLODIPINE BESYLATE 10 MG/1
TABLET ORAL
Qty: 30 TABLET | Refills: 5 | Status: SHIPPED | OUTPATIENT
Start: 2021-10-20 | End: 2022-05-18

## 2021-12-09 ENCOUNTER — OFFICE VISIT (OUTPATIENT)
Dept: INTERNAL MEDICINE CLINIC | Age: 56
End: 2021-12-09
Payer: MEDICAID

## 2021-12-09 VITALS
WEIGHT: 201 LBS | TEMPERATURE: 97 F | BODY MASS INDEX: 35.61 KG/M2 | SYSTOLIC BLOOD PRESSURE: 133 MMHG | RESPIRATION RATE: 18 BRPM | DIASTOLIC BLOOD PRESSURE: 87 MMHG | HEART RATE: 78 BPM | HEIGHT: 63 IN | OXYGEN SATURATION: 95 %

## 2021-12-09 DIAGNOSIS — L65.9 THINNING HAIR: Primary | ICD-10-CM

## 2021-12-09 DIAGNOSIS — R73.9 ELEVATED BLOOD SUGAR: ICD-10-CM

## 2021-12-09 DIAGNOSIS — R14.0 ABDOMINAL BLOATING: ICD-10-CM

## 2021-12-09 DIAGNOSIS — I10 ESSENTIAL HYPERTENSION: ICD-10-CM

## 2021-12-09 DIAGNOSIS — R23.8 SCALP IRRITATION: ICD-10-CM

## 2021-12-09 DIAGNOSIS — F17.200 TOBACCO USE DISORDER: ICD-10-CM

## 2021-12-09 PROCEDURE — 99406 BEHAV CHNG SMOKING 3-10 MIN: CPT | Performed by: INTERNAL MEDICINE

## 2021-12-09 PROCEDURE — 99214 OFFICE O/P EST MOD 30 MIN: CPT | Performed by: INTERNAL MEDICINE

## 2021-12-09 NOTE — PROGRESS NOTES
HISTORY OF PRESENT ILLNESS  Jazmine Orozco is a 60 y.o. female. /87 (BP 1 Location: Left upper arm, BP Patient Position: Sitting, BP Cuff Size: Large adult)   Pulse 78   Temp 97 °F (36.1 °C) (Temporal)   Resp 18   Ht 5' 3\" (1.6 m)   Wt 201 lb (91.2 kg)   SpO2 95%   BMI 35.61 kg/m²     Hypertension   The history is provided by the patient. This is a chronic problem. The current episode started more than 1 week ago. The problem has not changed since onset. Pertinent negatives include no chest pain, no palpitations and no shortness of breath. Hair/Scalp Problem  The history is provided by the patient. This is a chronic problem. The problem occurs daily. Pertinent negatives include no chest pain and no shortness of breath. Review of Systems   Constitutional: Negative for chills and fever. Respiratory: Negative for cough and shortness of breath. Cardiovascular: Negative for chest pain and palpitations. Skin:        Thinning hair, \"burning\" scalp       Physical Exam  Vitals and nursing note reviewed. Constitutional:       Appearance: Normal appearance. She is well-developed. HENT:      Head: Normocephalic and atraumatic. Cardiovascular:      Rate and Rhythm: Normal rate and regular rhythm. Pulmonary:      Effort: Pulmonary effort is normal.      Breath sounds: Normal breath sounds. Skin:     General: Skin is warm and dry. Comments: Nothing noted on scalp except for thinning frontal and occipital regions   Neurological:      General: No focal deficit present. Mental Status: She is alert and oriented to person, place, and time. Psychiatric:         Mood and Affect: Mood normal.         Behavior: Behavior normal.         ASSESSMENT and PLAN    ICD-10-CM ICD-9-CM    1.  Thinning hair  L65.9 704.00 REFERRAL TO DERMATOLOGY      METABOLIC PANEL, COMPREHENSIVE      CBC WITH AUTOMATED DIFF      TSH 3RD GENERATION      HEMOGLOBIN A1C W/O EAG      IRON   2. Scalp irritation  R23.8 709.9 REFERRAL TO DERMATOLOGY      METABOLIC PANEL, COMPREHENSIVE      CBC WITH AUTOMATED DIFF   3. Elevated blood sugar  W89.8 952.12 METABOLIC PANEL, COMPREHENSIVE      HEMOGLOBIN A1C W/O EAG   4. Essential hypertension  Y80 280.4 METABOLIC PANEL, COMPREHENSIVE      LIPID PANEL   5. Abdominal bloating  W73.3 304.3 METABOLIC PANEL, COMPREHENSIVE      CBC WITH AUTOMATED DIFF      IRON   6. Tobacco use disorder  F17.200 305.1        Declines vaccines    She will f/u on her mammogram. Ph # to [facility] provided    She will  her FIT card    Refer to dermatology    Has cut down a lot on her smoking. A pack lasts 4 days now. She is still working on stopping. Discussed 3-4 minutes.     F/u 6 months for [employer] with PAP

## 2021-12-09 NOTE — PROGRESS NOTES
Reviewed record in preparation for visit and have obtained necessary documentation. Monica Bañuelos is a 60 y.o.  female presents today for office visit for   Chief Complaint   Patient presents with    Hypertension    Hair/Scalp Problem   . Pt is  fasting. Patient is accompanied by self. I have received verbal consent from Monica Bañuelos to discuss any/all medical information while they are present in the room. Pt is in Room # 1215 [place] preferred language for health care discussion is english/other. Is the patient using any DME equipment during OV? NO    Advance Directive:  1. Do you have an advance directive in place? Patient Reply: NO    2. If not, would you like material regarding how to put one in place? NO      1. \"Have you been to the ER, urgent care clinic since your last visit? Hospitalized since your last visit? \" Yes [facility] for a Mouth problem 10/2021    2. \"Have you seen or consulted any other health care providers outside of the [address] [place] [address] since your last visit? \" No     3. For patients aged 39-70: Has the patient had a colonoscopy? Yes, HM satisfied with blue hyperlink     If the patient is female:    4. For patients aged 41-77: Has the patient had a mammogram within the past 2 years? No    5. For patients aged 21-65: Has the patient had a pap smear? No    Upcoming Appts  No    VORB: No orders of the defined types were placed in this encounter.  Gurdeep Waldron MD/Meagan Altamirano LPN    Monica Bañuelos is due for:   Health Maintenance Due   Topic    Pneumococcal 0-64 years (1 of 2 - PPSV23)    COVID-19 Vaccine (1)    Cervical cancer screen     Colorectal Cancer Screening Combo     Breast Cancer Screen Mammogram     Medicare Yearly Exam     Lipid Screen     Flu Vaccine (1)   Patient declines the influenza, COVID-19, Pneumococcal vaccines at this time, PCP notified.     Health Maintenance reviewed and discussed per provider  Please order/place referral if appropriate. Requested Prescriptions      No prescriptions requested or ordered in this encounter       Learning Assessment:  Learning Assessment 5/7/2015 5/13/2014   PRIMARY LEARNER Patient Patient   HIGHEST LEVEL OF EDUCATION - PRIMARY LEARNER  DID NOT GRADUATE HIGH SCHOOL DID NOT GRADUATE HIGH SCHOOL   BARRIERS PRIMARY LEARNER NONE NONE   CO-LEARNER CAREGIVER - No   PRIMARY LANGUAGE ENGLISH ENGLISH    NEED - No   LEARNER PREFERENCE PRIMARY DEMONSTRATION DEMONSTRATION   LEARNING SPECIAL TOPICS - none   ANSWERED BY patient self   RELATIONSHIP SELF SELF     Depression Screening:  3 most recent [facility] Screens 12/9/2021 2/17/2021 5/11/2020 6/15/2018 3/5/2018 7/13/2017 3/9/2017   Little interest or pleasure in doing things Not at all Several days Not at all Not at all Not at all Not at all Not at all   Feeling down, depressed, irritable, or hopeless Not at all Several days Not at all Not at all Not at all Not at all Not at all   Total Score PHQ 2 0 2 0 0 0 0 0     Abuse Screening:  Abuse Screening Questionnaire 11/28/2017 4/6/2015   Do you ever feel afraid of your partner? N N   Are you in a relationship with someone who physically or mentally threatens you? N N   Is it safe for you to go home? Y Y     Fall Risk  No flowsheet data found.   Recent Travel Screening and Travel History documentation     Travel Screening     No screening recorded since 12/08/21 0000     Travel History   Travel since 11/09/21    No documented travel since 11/09/21

## 2021-12-10 LAB
A-G RATIO,AGRAT: 1.7 RATIO (ref 1.1–2.6)
ABSOLUTE LYMPHOCYTE COUNT, 10803: 1.9 K/UL (ref 1–4.8)
ALBUMIN SERPL-MCNC: 4.7 G/DL (ref 3.5–5)
ALP SERPL-CCNC: 168 U/L (ref 25–115)
ALT SERPL-CCNC: 25 U/L (ref 5–40)
ANION GAP SERPL CALC-SCNC: 14 MMOL/L (ref 3–15)
AST SERPL W P-5'-P-CCNC: 18 U/L (ref 10–37)
AVG GLU, 10930: 126 MG/DL (ref 91–123)
BASOPHILS # BLD: 0 K/UL (ref 0–0.2)
BASOPHILS NFR BLD: 0 % (ref 0–2)
BILIRUB SERPL-MCNC: 0.2 MG/DL (ref 0.2–1.2)
BUN SERPL-MCNC: 22 MG/DL (ref 6–22)
CALCIUM SERPL-MCNC: 10.2 MG/DL (ref 8.4–10.5)
CHLORIDE SERPL-SCNC: 101 MMOL/L (ref 98–110)
CHOLEST SERPL-MCNC: 282 MG/DL (ref 110–200)
CO2 SERPL-SCNC: 25 MMOL/L (ref 20–32)
CREAT SERPL-MCNC: 0.6 MG/DL (ref 0.5–1.2)
EOSINOPHIL # BLD: 0.1 K/UL (ref 0–0.5)
EOSINOPHIL NFR BLD: 1 % (ref 0–6)
ERYTHROCYTE [DISTWIDTH] IN BLOOD BY AUTOMATED COUNT: 14.1 % (ref 10–15.5)
GFRAA, 66117: >60
GFRNA, 66118: >60
GLOBULIN,GLOB: 2.7 G/DL (ref 2–4)
GLUCOSE SERPL-MCNC: 99 MG/DL (ref 70–99)
GRANULOCYTES,GRANS: 59 % (ref 40–75)
HBA1C MFR BLD HPLC: 6 % (ref 4.8–5.6)
HCT VFR BLD AUTO: 44.5 % (ref 35.1–48)
HDLC SERPL-MCNC: 4 MG/DL (ref 0–5)
HDLC SERPL-MCNC: 70 MG/DL
HGB BLD-MCNC: 13.7 G/DL (ref 11.7–16)
IRON,IRN: 67 MCG/DL (ref 30–160)
LDL/HDL RATIO,LDHD: 2.5
LDLC SERPL CALC-MCNC: 174 MG/DL (ref 50–99)
LYMPHOCYTES, LYMLT: 33 % (ref 20–45)
MCH RBC QN AUTO: 30 PG (ref 26–34)
MCHC RBC AUTO-ENTMCNC: 31 G/DL (ref 31–36)
MCV RBC AUTO: 97 FL (ref 80–99)
MONOCYTES # BLD: 0.3 K/UL (ref 0.1–1)
MONOCYTES NFR BLD: 6 % (ref 3–12)
NEUTROPHILS # BLD AUTO: 3.4 K/UL (ref 1.8–7.7)
NON-HDL CHOLESTEROL, 011976: 212 MG/DL
PLATELET # BLD AUTO: 368 K/UL (ref 140–440)
PMV BLD AUTO: 9.8 FL (ref 9–13)
POTASSIUM SERPL-SCNC: 4.4 MMOL/L (ref 3.5–5.5)
PROT SERPL-MCNC: 7.4 G/DL (ref 6.4–8.3)
RBC # BLD AUTO: 4.59 M/UL (ref 3.8–5.2)
SODIUM SERPL-SCNC: 140 MMOL/L (ref 133–145)
TRIGL SERPL-MCNC: 190 MG/DL (ref 40–149)
TSH SERPL DL<=0.005 MIU/L-ACNC: 0.53 MCU/ML (ref 0.27–4.2)
VLDLC SERPL CALC-MCNC: 38 MG/DL (ref 8–30)
WBC # BLD AUTO: 5.7 K/UL (ref 4–11)

## 2022-03-18 PROBLEM — R21 RASH: Status: ACTIVE | Noted: 2017-06-05

## 2022-03-18 PROBLEM — M54.50 LOW BACK PAIN: Status: ACTIVE | Noted: 2017-01-05

## 2022-03-18 PROBLEM — N30.01 ACUTE CYSTITIS WITH HEMATURIA: Status: ACTIVE | Noted: 2017-06-05

## 2022-03-19 PROBLEM — R39.9 UTI SYMPTOMS: Status: ACTIVE | Noted: 2017-05-01

## 2022-03-19 PROBLEM — L29.9 ITCHING: Status: ACTIVE | Noted: 2017-06-05

## 2022-03-19 PROBLEM — K08.89 PAIN IN A TOOTH OR TEETH: Status: ACTIVE | Noted: 2017-01-05

## 2022-03-19 PROBLEM — R22.0 SWELLING OF GUMS: Status: ACTIVE | Noted: 2017-01-05

## 2022-03-19 PROBLEM — R82.90 ABNORMAL URINE ODOR: Status: ACTIVE | Noted: 2017-05-01

## 2022-03-19 PROBLEM — R52 BODY ACHES: Status: ACTIVE | Noted: 2017-05-01

## 2022-03-20 PROBLEM — R04.0 BLEEDING NOSE: Status: ACTIVE | Noted: 2017-06-05

## 2022-04-13 DIAGNOSIS — Z76.0 MEDICATION REFILL: ICD-10-CM

## 2022-04-13 DIAGNOSIS — R60.9 EDEMA, UNSPECIFIED TYPE: ICD-10-CM

## 2022-04-13 RX ORDER — FUROSEMIDE 40 MG/1
TABLET ORAL
Qty: 30 TABLET | Refills: 5 | Status: SHIPPED | OUTPATIENT
Start: 2022-04-13

## 2022-04-13 RX ORDER — IBUPROFEN 800 MG/1
TABLET ORAL
Qty: 90 TABLET | Refills: 5 | Status: SHIPPED | OUTPATIENT
Start: 2022-04-13

## 2022-04-13 NOTE — TELEPHONE ENCOUNTER
Patient request for refill, is out of medication.   Requested Prescriptions     Pending Prescriptions Disp Refills    furosemide (LASIX) 40 mg tablet 30 Tablet 5     Sig: TAKE ONE-HALF TO ONE TABLET BY MOUTH ONCE DAILY AS NEEDED FOR SWELLING

## 2022-05-18 DIAGNOSIS — Z76.0 MEDICATION REFILL: ICD-10-CM

## 2022-05-18 RX ORDER — AMLODIPINE BESYLATE 10 MG/1
TABLET ORAL
Qty: 30 TABLET | Refills: 5 | Status: SHIPPED | OUTPATIENT
Start: 2022-05-18

## 2022-12-13 ENCOUNTER — OFFICE VISIT (OUTPATIENT)
Dept: INTERNAL MEDICINE CLINIC | Age: 57
End: 2022-12-13
Payer: MEDICAID

## 2022-12-13 VITALS
DIASTOLIC BLOOD PRESSURE: 86 MMHG | WEIGHT: 176.5 LBS | HEIGHT: 63 IN | HEART RATE: 68 BPM | BODY MASS INDEX: 31.27 KG/M2 | OXYGEN SATURATION: 98 % | TEMPERATURE: 96.6 F | RESPIRATION RATE: 15 BRPM | SYSTOLIC BLOOD PRESSURE: 136 MMHG

## 2022-12-13 DIAGNOSIS — E78.5 DYSLIPIDEMIA: ICD-10-CM

## 2022-12-13 DIAGNOSIS — R60.9 EDEMA, UNSPECIFIED TYPE: ICD-10-CM

## 2022-12-13 DIAGNOSIS — Z12.31 SCREENING MAMMOGRAM FOR HIGH-RISK PATIENT: ICD-10-CM

## 2022-12-13 DIAGNOSIS — Z12.11 SCREEN FOR COLON CANCER: ICD-10-CM

## 2022-12-13 DIAGNOSIS — L65.9 THINNING HAIR: ICD-10-CM

## 2022-12-13 DIAGNOSIS — R23.8 SCALP IRRITATION: Primary | ICD-10-CM

## 2022-12-13 DIAGNOSIS — R10.13 EPIGASTRIC PAIN: ICD-10-CM

## 2022-12-13 DIAGNOSIS — Z76.0 MEDICATION REFILL: ICD-10-CM

## 2022-12-13 RX ORDER — FUROSEMIDE 40 MG/1
TABLET ORAL
Qty: 90 TABLET | Refills: 3 | Status: SHIPPED | OUTPATIENT
Start: 2022-12-13

## 2022-12-13 RX ORDER — OMEPRAZOLE 40 MG/1
40 CAPSULE, DELAYED RELEASE ORAL DAILY
Qty: 30 CAPSULE | Refills: 2 | Status: SHIPPED | OUTPATIENT
Start: 2022-12-13

## 2022-12-13 NOTE — PROGRESS NOTES
HISTORY OF PRESENT ILLNESS  Elvis Thornton is a 59 y.o. female. /86 (BP 1 Location: Right arm, BP Patient Position: Sitting, BP Cuff Size: Adult long)   Pulse 68   Temp (!) 96.6 °F (35.9 °C) (Temporal)   Resp 15   Ht 5' 3\" (1.6 m)   Wt 176 lb 8 oz (80.1 kg)   SpO2 98%   BMI 31.27 kg/m²     Pt hasn't been seen in a year. Hair/Scalp Problem  The history is provided by the Patient (pt has seen derm but says the treatment is not working and her scalp still \"burns\"). This is a chronic problem. Abdominal pain: hurts when she eats. Even water hurts when it goes down. Abdominal Pain  The history is provided by the Patient (when eating she notes abdomianl pain. Even water burns when it hits her stomach). This is a chronic problem. The current episode started more than 1 week ago. Abdominal pain: hurts when she eats. Even water hurts when it goes down. Review of Systems   Constitutional:  Negative for chills and fever. Gastrointestinal:  Negative for nausea and vomiting. Abdominal pain: hurts when she eats. Even water hurts when it goes down. Physical Exam  Vitals and nursing note reviewed. Constitutional:       Appearance: Normal appearance. She is well-developed. HENT:      Head: Normocephalic and atraumatic. Cardiovascular:      Rate and Rhythm: Normal rate and regular rhythm. Pulmonary:      Effort: Pulmonary effort is normal.      Breath sounds: Normal breath sounds. Musculoskeletal:      Right lower leg: No edema. Left lower leg: No edema. Skin:     General: Skin is warm and dry. Comments: Is thinning lis at the margins. No scaling, sores, noted   Neurological:      General: No focal deficit present. Mental Status: She is alert and oriented to person, place, and time.    Psychiatric:         Mood and Affect: Mood normal.         Behavior: Behavior normal.       ASSESSMENT and PLAN    ICD-10-CM ICD-9-CM    1. Scalp irritation  R23.8 709.9 TSH AND FREE T4 VITAMIN B12      VITAMIN D, 25 HYDROXY      VITAMIN D, 25 HYDROXY      VITAMIN B12      TSH AND FREE T4      2. Thinning hair  L65.9 704.00 TSH AND FREE T4      VITAMIN B12      VITAMIN D, 25 HYDROXY      VITAMIN D, 25 HYDROXY      VITAMIN B12      TSH AND FREE T4      3. Epigastric pain  R10.13 789.06 omeprazole (PRILOSEC) 40 mg capsule      CBC WITH AUTOMATED DIFF      CBC WITH AUTOMATED DIFF      4. Edema, unspecified type  R60.9 782.3 furosemide (LASIX) 40 mg tablet      METABOLIC PANEL, COMPREHENSIVE      CBC WITH AUTOMATED DIFF      CBC WITH AUTOMATED DIFF      METABOLIC PANEL, COMPREHENSIVE      5. Dyslipidemia  D61.2 135.0 METABOLIC PANEL, COMPREHENSIVE      LIPID PANEL      LIPID PANEL      METABOLIC PANEL, COMPREHENSIVE      6. Screening mammogram for high-risk patient  Z12.31 V76.11 MASSIEL MAMMO BI SCREENING INCL CAD      7. Screen for colon cancer  Z12.11 V76.51 REFERRAL TO GASTROENTEROLOGY      8. Medication refill  Z76.0 V68.1 furosemide (LASIX) 40 mg tablet        Advised her to f/u with dermatology. She was seen at [facility] location  Update all lab  Epigastric pain. Likely GERD. Start omeprazole and refer to GI.  She is due for colon screening as well  Refills as noted  F/u one month for [employer] (also [address]) and to f/u on testing

## 2022-12-13 NOTE — PROGRESS NOTES
1. \"Have you been to the ER, urgent care clinic since your last visit? Hospitalized since your last visit? \" No    2. \"Have you seen or consulted any other health care providers outside of the [address] [place] [address] since your last visit? \" Yes Dermatology      3. For patients aged 39-70: Has the patient had a colonoscopy / FIT/ Cologuard? No      If the patient is female:    4. For patients aged 41-77: Has the patient had a mammogram within the past 2 years? No      5. For patients aged 21-65: Has the patient had a pap smear?  No

## 2022-12-14 LAB
25(OH)D3 SERPL-MCNC: 43.5 NG/ML (ref 32–100)
A-G RATIO,AGRAT: 1.7 RATIO (ref 1.1–2.6)
ABSOLUTE LYMPHOCYTE COUNT, 10803: 2.6 K/UL (ref 1–4.8)
ALBUMIN SERPL-MCNC: 4.4 G/DL (ref 3.5–5)
ALP SERPL-CCNC: 155 U/L (ref 25–115)
ALT SERPL-CCNC: 16 U/L (ref 5–40)
ANION GAP SERPL CALC-SCNC: 13 MMOL/L (ref 3–15)
AST SERPL W P-5'-P-CCNC: 16 U/L (ref 10–37)
BASOPHILS # BLD: 0.1 K/UL (ref 0–0.2)
BASOPHILS NFR BLD: 1 % (ref 0–2)
BILIRUB SERPL-MCNC: 0.2 MG/DL (ref 0.2–1.2)
BUN SERPL-MCNC: 21 MG/DL (ref 6–22)
CALCIUM SERPL-MCNC: 10.1 MG/DL (ref 8.4–10.5)
CHLORIDE SERPL-SCNC: 103 MMOL/L (ref 98–110)
CHOLEST SERPL-MCNC: 250 MG/DL (ref 110–200)
CO2 SERPL-SCNC: 25 MMOL/L (ref 20–32)
CREAT SERPL-MCNC: 0.6 MG/DL (ref 0.5–1.2)
EOSINOPHIL # BLD: 0.1 K/UL (ref 0–0.5)
EOSINOPHIL NFR BLD: 1 % (ref 0–6)
ERYTHROCYTE [DISTWIDTH] IN BLOOD BY AUTOMATED COUNT: 14.1 % (ref 10–15.5)
GLOBULIN,GLOB: 2.6 G/DL (ref 2–4)
GLOMERULAR FILTRATION RATE: >60 ML/MIN/1.73 SQ.M.
GLUCOSE SERPL-MCNC: 99 MG/DL (ref 70–99)
GRANULOCYTES,GRANS: 51 % (ref 40–75)
HCT VFR BLD AUTO: 43.8 % (ref 35.1–48)
HDLC SERPL-MCNC: 3.7 MG/DL (ref 0–5)
HDLC SERPL-MCNC: 68 MG/DL
HGB BLD-MCNC: 14 G/DL (ref 11.7–16)
LDL/HDL RATIO,LDHD: 2.1
LDLC SERPL CALC-MCNC: 145 MG/DL (ref 50–99)
LYMPHOCYTES, LYMLT: 41 % (ref 20–45)
MCH RBC QN AUTO: 30 PG (ref 26–34)
MCHC RBC AUTO-ENTMCNC: 32 G/DL (ref 31–36)
MCV RBC AUTO: 93 FL (ref 80–99)
MONOCYTES # BLD: 0.4 K/UL (ref 0.1–1)
MONOCYTES NFR BLD: 6 % (ref 3–12)
NEUTROPHILS # BLD AUTO: 3.3 K/UL (ref 1.8–7.7)
NON-HDL CHOLESTEROL, 011976: 182 MG/DL
PLATELET # BLD AUTO: 392 K/UL (ref 140–440)
PMV BLD AUTO: 10.1 FL (ref 9–13)
POTASSIUM SERPL-SCNC: 4.1 MMOL/L (ref 3.5–5.5)
PROT SERPL-MCNC: 7 G/DL (ref 6.4–8.3)
RBC # BLD AUTO: 4.69 M/UL (ref 3.8–5.2)
SODIUM SERPL-SCNC: 141 MMOL/L (ref 133–145)
T4 FREE SERPL-MCNC: 1.3 NG/DL (ref 0.9–1.8)
TRIGL SERPL-MCNC: 184 MG/DL (ref 40–149)
TSH SERPL DL<=0.005 MIU/L-ACNC: 0.54 MCU/ML (ref 0.27–4.2)
VIT B12 SERPL-MCNC: 998 PG/ML (ref 211–911)
VLDLC SERPL CALC-MCNC: 37 MG/DL (ref 8–30)
WBC # BLD AUTO: 6.5 K/UL (ref 4–11)

## 2023-02-06 ENCOUNTER — HOSPITAL ENCOUNTER (OUTPATIENT)
Dept: WOMENS IMAGING | Age: 58
Discharge: HOME | End: 2023-02-06
Attending: INTERNAL MEDICINE
Payer: MEDICAID

## 2023-02-06 DIAGNOSIS — Z12.31 SCREENING MAMMOGRAM FOR HIGH-RISK PATIENT: ICD-10-CM

## 2023-02-06 PROCEDURE — 77063 BREAST TOMOSYNTHESIS BI: CPT

## 2023-05-09 ENCOUNTER — OFFICE VISIT (OUTPATIENT)
Facility: CLINIC | Age: 58
End: 2023-05-09
Payer: MEDICAID

## 2023-05-09 VITALS
SYSTOLIC BLOOD PRESSURE: 127 MMHG | WEIGHT: 176 LBS | BODY MASS INDEX: 31.18 KG/M2 | TEMPERATURE: 97 F | HEIGHT: 63 IN | DIASTOLIC BLOOD PRESSURE: 83 MMHG | HEART RATE: 82 BPM | OXYGEN SATURATION: 92 % | RESPIRATION RATE: 15 BRPM

## 2023-05-09 DIAGNOSIS — Z01.419 WELL WOMAN EXAM WITH ROUTINE GYNECOLOGICAL EXAM: Primary | ICD-10-CM

## 2023-05-09 DIAGNOSIS — Z12.11 SCREEN FOR COLON CANCER: ICD-10-CM

## 2023-05-09 DIAGNOSIS — Z12.4 SCREENING FOR CERVICAL CANCER: ICD-10-CM

## 2023-05-09 DIAGNOSIS — R07.89 OTHER CHEST PAIN: ICD-10-CM

## 2023-05-09 PROCEDURE — 99396 PREV VISIT EST AGE 40-64: CPT | Performed by: INTERNAL MEDICINE

## 2023-05-09 PROCEDURE — 3079F DIAST BP 80-89 MM HG: CPT | Performed by: INTERNAL MEDICINE

## 2023-05-09 PROCEDURE — 3074F SYST BP LT 130 MM HG: CPT | Performed by: INTERNAL MEDICINE

## 2023-05-09 SDOH — ECONOMIC STABILITY: FOOD INSECURITY: WITHIN THE PAST 12 MONTHS, THE FOOD YOU BOUGHT JUST DIDN'T LAST AND YOU DIDN'T HAVE MONEY TO GET MORE.: NEVER TRUE

## 2023-05-09 SDOH — ECONOMIC STABILITY: INCOME INSECURITY: HOW HARD IS IT FOR YOU TO PAY FOR THE VERY BASICS LIKE FOOD, HOUSING, MEDICAL CARE, AND HEATING?: NOT HARD AT ALL

## 2023-05-09 SDOH — ECONOMIC STABILITY: HOUSING INSECURITY
IN THE LAST 12 MONTHS, WAS THERE A TIME WHEN YOU DID NOT HAVE A STEADY PLACE TO SLEEP OR SLEPT IN A SHELTER (INCLUDING NOW)?: NO

## 2023-05-09 SDOH — ECONOMIC STABILITY: FOOD INSECURITY: WITHIN THE PAST 12 MONTHS, YOU WORRIED THAT YOUR FOOD WOULD RUN OUT BEFORE YOU GOT MONEY TO BUY MORE.: NEVER TRUE

## 2023-05-09 ASSESSMENT — PATIENT HEALTH QUESTIONNAIRE - PHQ9
1. LITTLE INTEREST OR PLEASURE IN DOING THINGS: 0
2. FEELING DOWN, DEPRESSED OR HOPELESS: 0
SUM OF ALL RESPONSES TO PHQ QUESTIONS 1-9: 0
SUM OF ALL RESPONSES TO PHQ QUESTIONS 1-9: 0
SUM OF ALL RESPONSES TO PHQ9 QUESTIONS 1 & 2: 0
SUM OF ALL RESPONSES TO PHQ QUESTIONS 1-9: 0
SUM OF ALL RESPONSES TO PHQ QUESTIONS 1-9: 0

## 2023-05-09 NOTE — PROGRESS NOTES
Jessenia Elizabeth is a 59 y.o.  female presents today for office visit for   Chief Complaint   Patient presents with    Gynecologic Exam   .  1. \"Have you been to the ER, urgent care clinic since your last visit? Hospitalized since your last visit? \" [facility] ED 4/16 for chest pain. 2. \"Have you seen or consulted any other health care providers outside of the [address] [place] [address] since your last visit? \" No     3. For patients aged 39-70: Has the patient had a colonoscopy / FIT/ Cologuard? No      If the patient is female:    4. For patients aged 41-77: Has the patient had a mammogram within the past 2 years? Yes    5. For patients aged 21-65: Has the patient had a pap smear? Will complete in office today.
Skin:     General: Skin is warm and dry. Neurological:      General: No focal deficit present. Mental Status: She is alert and oriented to person, place, and time. Psychiatric:         Mood and Affect: Mood normal.         Behavior: Behavior normal.       ASSESSMENT and PLAN      ICD-10-CM    1. Well woman exam with routine gynecological exam  Z01.419 PAP IG, Aptima HPV and rfx 16/18,45 ([id number])      2. Screening for cervical cancer  Z12.4       3. Other chest pain  R07.89 [facility])      4. Screen for colon cancer  Z12.11 External Referral To Gastroenterology           Reviewed ER note.  Will refer to cardiology for clarification and evaluation of cardiac CT report of possible LAD stenosis    Update PAP today    F/u 6 months for HTN,

## 2023-05-11 LAB
HPV DNA HIGH RISK: NEGATIVE
HPV GENOTYPE: NEGATIVE
HPV, GENOTYPE 18: NEGATIVE
PAP IMAGE GUIDED: NORMAL

## 2024-01-22 RX ORDER — AMLODIPINE BESYLATE 10 MG/1
TABLET ORAL
Qty: 90 TABLET | Refills: 3 | Status: SHIPPED | OUTPATIENT
Start: 2024-01-22

## 2024-01-22 NOTE — TELEPHONE ENCOUNTER
Chart review: No follow up appointment scheduled for F/u 6 months for HTN, . PCP notified of this request.